# Patient Record
Sex: FEMALE | Race: BLACK OR AFRICAN AMERICAN | NOT HISPANIC OR LATINO | Employment: UNEMPLOYED | URBAN - METROPOLITAN AREA
[De-identification: names, ages, dates, MRNs, and addresses within clinical notes are randomized per-mention and may not be internally consistent; named-entity substitution may affect disease eponyms.]

---

## 2022-05-10 ENCOUNTER — OFFICE VISIT (OUTPATIENT)
Dept: PRIMARY CARE CLINIC | Facility: CLINIC | Age: 82
End: 2022-05-10
Payer: MEDICARE

## 2022-05-10 VITALS
BODY MASS INDEX: 36.33 KG/M2 | OXYGEN SATURATION: 95 % | SYSTOLIC BLOOD PRESSURE: 130 MMHG | WEIGHT: 218.06 LBS | TEMPERATURE: 98 F | HEART RATE: 78 BPM | DIASTOLIC BLOOD PRESSURE: 90 MMHG | HEIGHT: 65 IN

## 2022-05-10 DIAGNOSIS — I10 BENIGN ESSENTIAL HTN: ICD-10-CM

## 2022-05-10 DIAGNOSIS — M1A.9XX0 CHRONIC GOUT INVOLVING TOE WITHOUT TOPHUS, UNSPECIFIED CAUSE, UNSPECIFIED LATERALITY: ICD-10-CM

## 2022-05-10 DIAGNOSIS — Z00.00 ROUTINE GENERAL MEDICAL EXAMINATION AT A HEALTH CARE FACILITY: ICD-10-CM

## 2022-05-10 DIAGNOSIS — N32.89 BLADDER SPASM: ICD-10-CM

## 2022-05-10 DIAGNOSIS — R35.0 FREQUENT URINATION: Primary | ICD-10-CM

## 2022-05-10 DIAGNOSIS — M54.9 BACK PAIN, UNSPECIFIED BACK LOCATION, UNSPECIFIED BACK PAIN LATERALITY, UNSPECIFIED CHRONICITY: ICD-10-CM

## 2022-05-10 LAB
BILIRUB SERPL-MCNC: ABNORMAL MG/DL
BLOOD, POC UA: ABNORMAL
GLUCOSE UR QL STRIP: NORMAL
KETONES UR QL STRIP: ABNORMAL
LEUKOCYTE ESTERASE URINE, POC: ABNORMAL
NITRITE, POC UA: ABNORMAL
PH, POC UA: 5
PROTEIN, POC: 30
SPECIFIC GRAVITY, POC UA: 1.02
UROBILINOGEN, POC UA: 1

## 2022-05-10 PROCEDURE — 81003 POCT URINALYSIS: ICD-10-PCS | Mod: QW,S$GLB,, | Performed by: INTERNAL MEDICINE

## 2022-05-10 PROCEDURE — 99999 PR PBB SHADOW E&M-NEW PATIENT-LVL IV: ICD-10-PCS | Mod: PBBFAC,,, | Performed by: INTERNAL MEDICINE

## 2022-05-10 PROCEDURE — 3075F SYST BP GE 130 - 139MM HG: CPT | Mod: CPTII,S$GLB,, | Performed by: INTERNAL MEDICINE

## 2022-05-10 PROCEDURE — 3075F PR MOST RECENT SYSTOLIC BLOOD PRESS GE 130-139MM HG: ICD-10-PCS | Mod: CPTII,S$GLB,, | Performed by: INTERNAL MEDICINE

## 2022-05-10 PROCEDURE — 3288F PR FALLS RISK ASSESSMENT DOCUMENTED: ICD-10-PCS | Mod: CPTII,S$GLB,, | Performed by: INTERNAL MEDICINE

## 2022-05-10 PROCEDURE — 99999 PR PBB SHADOW E&M-NEW PATIENT-LVL IV: CPT | Mod: PBBFAC,,, | Performed by: INTERNAL MEDICINE

## 2022-05-10 PROCEDURE — 1101F PT FALLS ASSESS-DOCD LE1/YR: CPT | Mod: CPTII,S$GLB,, | Performed by: INTERNAL MEDICINE

## 2022-05-10 PROCEDURE — 1159F PR MEDICATION LIST DOCUMENTED IN MEDICAL RECORD: ICD-10-PCS | Mod: CPTII,S$GLB,, | Performed by: INTERNAL MEDICINE

## 2022-05-10 PROCEDURE — 3288F FALL RISK ASSESSMENT DOCD: CPT | Mod: CPTII,S$GLB,, | Performed by: INTERNAL MEDICINE

## 2022-05-10 PROCEDURE — 99204 PR OFFICE/OUTPT VISIT, NEW, LEVL IV, 45-59 MIN: ICD-10-PCS | Mod: S$GLB,,, | Performed by: INTERNAL MEDICINE

## 2022-05-10 PROCEDURE — 3080F PR MOST RECENT DIASTOLIC BLOOD PRESSURE >= 90 MM HG: ICD-10-PCS | Mod: CPTII,S$GLB,, | Performed by: INTERNAL MEDICINE

## 2022-05-10 PROCEDURE — 1101F PR PT FALLS ASSESS DOC 0-1 FALLS W/OUT INJ PAST YR: ICD-10-PCS | Mod: CPTII,S$GLB,, | Performed by: INTERNAL MEDICINE

## 2022-05-10 PROCEDURE — 1160F PR REVIEW ALL MEDS BY PRESCRIBER/CLIN PHARMACIST DOCUMENTED: ICD-10-PCS | Mod: CPTII,S$GLB,, | Performed by: INTERNAL MEDICINE

## 2022-05-10 PROCEDURE — 3080F DIAST BP >= 90 MM HG: CPT | Mod: CPTII,S$GLB,, | Performed by: INTERNAL MEDICINE

## 2022-05-10 PROCEDURE — 99204 OFFICE O/P NEW MOD 45 MIN: CPT | Mod: S$GLB,,, | Performed by: INTERNAL MEDICINE

## 2022-05-10 PROCEDURE — 1125F AMNT PAIN NOTED PAIN PRSNT: CPT | Mod: CPTII,S$GLB,, | Performed by: INTERNAL MEDICINE

## 2022-05-10 PROCEDURE — 1125F PR PAIN SEVERITY QUANTIFIED, PAIN PRESENT: ICD-10-PCS | Mod: CPTII,S$GLB,, | Performed by: INTERNAL MEDICINE

## 2022-05-10 PROCEDURE — 1159F MED LIST DOCD IN RCRD: CPT | Mod: CPTII,S$GLB,, | Performed by: INTERNAL MEDICINE

## 2022-05-10 PROCEDURE — 81003 URINALYSIS AUTO W/O SCOPE: CPT | Mod: QW,S$GLB,, | Performed by: INTERNAL MEDICINE

## 2022-05-10 PROCEDURE — 1160F RVW MEDS BY RX/DR IN RCRD: CPT | Mod: CPTII,S$GLB,, | Performed by: INTERNAL MEDICINE

## 2022-05-10 RX ORDER — OXYBUTYNIN CHLORIDE 5 MG/1
5 TABLET, EXTENDED RELEASE ORAL DAILY
Qty: 90 TABLET | Refills: 1 | Status: SHIPPED | OUTPATIENT
Start: 2022-05-10 | End: 2022-08-10 | Stop reason: ALTCHOICE

## 2022-05-10 NOTE — PATIENT INSTRUCTIONS
4 months for well visit or sooner if needed.   This is a reminder to be on time for your appointments. When I see patients, I want to be fair and give my time equally to all of my patients. We have 20 minutes together, and 10 minutes of that time is usually spent on rooming. Even small delays can make a huge difference in your visit so I advise you to check-in 10 minutes before your appointment time. This ensures that I can spend as much time with you as possible instead of needing to cut your visit short. Thank you for your consideration.

## 2022-05-10 NOTE — PROGRESS NOTES
Subjective:      Patient ID: Brittnee Gupta is a 81 y.o. female.    Chief Complaint: Back Pain (Lower back with discolored urine)    Here today for general exam.     Frequent urination: She is here to discuss her frequent urination. This is a chronic issue that has been persisting for at least 3 months. She reports difficulty holding onto her urine. Denies malodorous urine. POCT UA negative.     HTN: She continues on antihypertensives.  She did not bring in her medications at this time, she reports that she takes 2 medications for HTN. Obtain records. Office /90. No CP/SOB/lightheadedness/dizziness/palpitations.     Chronic gout: She has hx of chronic gout, normally in her big toes with flare up. She is taking allopurinol and colchicine.      Denies any chest pain, shortness of breath, nausea vomiting constipation diarrhea, blood in stool, heartburn    Review of Systems   Constitutional: Negative for chills, fever and weight loss.   HENT: Negative for congestion, ear pain and sore throat.    Eyes: Negative for double vision.   Respiratory: Negative for cough and shortness of breath.    Cardiovascular: Negative for chest pain, palpitations and leg swelling.   Gastrointestinal: Negative for abdominal pain, heartburn, nausea and vomiting.   Genitourinary: Positive for frequency and urgency. Negative for dysuria.   Skin: Negative for rash.   Neurological: Negative for dizziness, tingling and headaches.   Psychiatric/Behavioral: Negative for depression.         Current Outpatient Medications:     oxybutynin (DITROPAN-XL) 5 MG TR24, Take 1 tablet (5 mg total) by mouth once daily., Disp: 90 tablet, Rfl: 1    No results found for: HGBA1C  No results found for: MICALBCREAT  No results found for: LDLCALC, CHOL, HDL, TRIG    No results found for: NA, K, CL, CO2, GLU, BUN, CREATININE, CALCIUM, PROT, ALBUMIN, BILITOT, ALKPHOS, AST, ALT, ANIONGAP, ESTGFRAFRICA, EGFRNONAA, WBC, HGB, HCT, MCV, PLT, TSH, PSA, PSADIAG,  "HEPCAB    No results found for: LH, FSH, TOTALTESTOST, PROGESTERONE, ESTRADIOL, [id number], QMNYYNNJ75, FERRITIN, IRON, TRANSFERRIN, TIBC, FESATURATED, ZINC      History reviewed. No pertinent past medical history.  Past Surgical History:   Procedure Laterality Date    HYSTERECTOMY      MYOMECTOMY       Social History     Social History Narrative    Not on file     History reviewed. No pertinent family history.  Vitals:    05/10/22 1126   BP: (!) 130/90   Pulse: 78   Temp: 98.1 °F (36.7 °C)   SpO2: 95%   Weight: 98.9 kg (218 lb 0.6 oz)   Height: 5' 5" (1.651 m)   PainSc:   5     Objective:   Physical Exam  Vitals reviewed.   Constitutional:       Appearance: Normal appearance.   HENT:      Head: Normocephalic.      Right Ear: Tympanic membrane, ear canal and external ear normal.      Left Ear: Tympanic membrane, ear canal and external ear normal.      Nose: Nose normal.      Mouth/Throat:      Mouth: Mucous membranes are moist.      Pharynx: Oropharynx is clear.   Eyes:      Conjunctiva/sclera: Conjunctivae normal.      Pupils: Pupils are equal, round, and reactive to light.   Cardiovascular:      Rate and Rhythm: Normal rate and regular rhythm.      Pulses: Normal pulses.   Pulmonary:      Effort: Pulmonary effort is normal.      Breath sounds: Normal breath sounds.   Abdominal:      General: Abdomen is flat. Bowel sounds are normal.      Palpations: Abdomen is soft.   Musculoskeletal:      Cervical back: Neck supple.   Skin:     General: Skin is warm.   Neurological:      General: No focal deficit present.      Mental Status: She is alert.   Psychiatric:         Mood and Affect: Mood normal.       Assessment:     1. Frequent urination    2. Benign essential HTN    3. Back pain, unspecified back location, unspecified back pain laterality, unspecified chronicity    4. Chronic gout involving toe without tophus, unspecified cause, unspecified laterality    5. Routine general medical examination at a [facility]" facility    6. Bladder spasm      Plan:     Orders Placed This Encounter    Ambulatory referral/consult to Physical/Occupational Therapy    Ambulatory referral/consult to Podiatry    Ambulatory referral/consult to Urogynecology    POCT URINALYSIS    oxybutynin (DITROPAN-XL) 5 MG TR24       Patient Instructions   1. 4 months for well visit or sooner if needed.   2. This is a reminder to be on time for your appointments. When I see patients, I want to be fair and give my time equally to all of my patients. We have 20 minutes together, and 10 minutes of that time is usually spent on rooming. Even small delays can make a huge difference in your visit so I advise you to check-in 10 minutes before your appointment time. This ensures that I can spend as much time with you as possible instead of needing to cut your visit short. Thank you for your consideration.

## 2022-06-01 ENCOUNTER — OFFICE VISIT (OUTPATIENT)
Dept: UROGYNECOLOGY | Facility: CLINIC | Age: 82
End: 2022-06-01
Payer: MEDICARE

## 2022-06-01 VITALS
HEIGHT: 65 IN | WEIGHT: 216.06 LBS | DIASTOLIC BLOOD PRESSURE: 72 MMHG | BODY MASS INDEX: 36 KG/M2 | SYSTOLIC BLOOD PRESSURE: 124 MMHG

## 2022-06-01 DIAGNOSIS — R35.0 FREQUENT URINATION: ICD-10-CM

## 2022-06-01 DIAGNOSIS — N39.41 URINARY INCONTINENCE, URGE: ICD-10-CM

## 2022-06-01 DIAGNOSIS — R35.1 NOCTURIA: Primary | ICD-10-CM

## 2022-06-01 DIAGNOSIS — N32.89 BLADDER SPASM: ICD-10-CM

## 2022-06-01 DIAGNOSIS — K59.09 CHRONIC CONSTIPATION: ICD-10-CM

## 2022-06-01 DIAGNOSIS — N39.46 MIXED STRESS AND URGE URINARY INCONTINENCE: ICD-10-CM

## 2022-06-01 PROCEDURE — 1159F MED LIST DOCD IN RCRD: CPT | Mod: CPTII,S$GLB,, | Performed by: OBSTETRICS & GYNECOLOGY

## 2022-06-01 PROCEDURE — 3074F SYST BP LT 130 MM HG: CPT | Mod: CPTII,S$GLB,, | Performed by: OBSTETRICS & GYNECOLOGY

## 2022-06-01 PROCEDURE — 1159F PR MEDICATION LIST DOCUMENTED IN MEDICAL RECORD: ICD-10-PCS | Mod: CPTII,S$GLB,, | Performed by: OBSTETRICS & GYNECOLOGY

## 2022-06-01 PROCEDURE — 3288F FALL RISK ASSESSMENT DOCD: CPT | Mod: CPTII,S$GLB,, | Performed by: OBSTETRICS & GYNECOLOGY

## 2022-06-01 PROCEDURE — 51701 INSERT BLADDER CATHETER: CPT | Mod: S$GLB,,, | Performed by: OBSTETRICS & GYNECOLOGY

## 2022-06-01 PROCEDURE — 1160F PR REVIEW ALL MEDS BY PRESCRIBER/CLIN PHARMACIST DOCUMENTED: ICD-10-PCS | Mod: CPTII,S$GLB,, | Performed by: OBSTETRICS & GYNECOLOGY

## 2022-06-01 PROCEDURE — 99999 PR PBB SHADOW E&M-EST. PATIENT-LVL IV: CPT | Mod: PBBFAC,,, | Performed by: OBSTETRICS & GYNECOLOGY

## 2022-06-01 PROCEDURE — 99204 PR OFFICE/OUTPT VISIT, NEW, LEVL IV, 45-59 MIN: ICD-10-PCS | Mod: 25,S$GLB,, | Performed by: OBSTETRICS & GYNECOLOGY

## 2022-06-01 PROCEDURE — 3074F PR MOST RECENT SYSTOLIC BLOOD PRESSURE < 130 MM HG: ICD-10-PCS | Mod: CPTII,S$GLB,, | Performed by: OBSTETRICS & GYNECOLOGY

## 2022-06-01 PROCEDURE — 3078F PR MOST RECENT DIASTOLIC BLOOD PRESSURE < 80 MM HG: ICD-10-PCS | Mod: CPTII,S$GLB,, | Performed by: OBSTETRICS & GYNECOLOGY

## 2022-06-01 PROCEDURE — 3288F PR FALLS RISK ASSESSMENT DOCUMENTED: ICD-10-PCS | Mod: CPTII,S$GLB,, | Performed by: OBSTETRICS & GYNECOLOGY

## 2022-06-01 PROCEDURE — 51701 PR INSERTION OF NON-INDWELLING BLADDER CATHETERIZATION FOR RESIDUAL UR: ICD-10-PCS | Mod: S$GLB,,, | Performed by: OBSTETRICS & GYNECOLOGY

## 2022-06-01 PROCEDURE — 3078F DIAST BP <80 MM HG: CPT | Mod: CPTII,S$GLB,, | Performed by: OBSTETRICS & GYNECOLOGY

## 2022-06-01 PROCEDURE — 1101F PT FALLS ASSESS-DOCD LE1/YR: CPT | Mod: CPTII,S$GLB,, | Performed by: OBSTETRICS & GYNECOLOGY

## 2022-06-01 PROCEDURE — 1126F AMNT PAIN NOTED NONE PRSNT: CPT | Mod: CPTII,S$GLB,, | Performed by: OBSTETRICS & GYNECOLOGY

## 2022-06-01 PROCEDURE — 99204 OFFICE O/P NEW MOD 45 MIN: CPT | Mod: 25,S$GLB,, | Performed by: OBSTETRICS & GYNECOLOGY

## 2022-06-01 PROCEDURE — 87086 URINE CULTURE/COLONY COUNT: CPT | Performed by: OBSTETRICS & GYNECOLOGY

## 2022-06-01 PROCEDURE — 1101F PR PT FALLS ASSESS DOC 0-1 FALLS W/OUT INJ PAST YR: ICD-10-PCS | Mod: CPTII,S$GLB,, | Performed by: OBSTETRICS & GYNECOLOGY

## 2022-06-01 PROCEDURE — 1160F RVW MEDS BY RX/DR IN RCRD: CPT | Mod: CPTII,S$GLB,, | Performed by: OBSTETRICS & GYNECOLOGY

## 2022-06-01 PROCEDURE — 99999 PR PBB SHADOW E&M-EST. PATIENT-LVL IV: ICD-10-PCS | Mod: PBBFAC,,, | Performed by: OBSTETRICS & GYNECOLOGY

## 2022-06-01 PROCEDURE — 1126F PR PAIN SEVERITY QUANTIFIED, NO PAIN PRESENT: ICD-10-PCS | Mod: CPTII,S$GLB,, | Performed by: OBSTETRICS & GYNECOLOGY

## 2022-06-01 RX ORDER — METOPROLOL SUCCINATE 25 MG/1
25 TABLET, EXTENDED RELEASE ORAL DAILY
COMMUNITY
End: 2023-03-10 | Stop reason: SDUPTHER

## 2022-06-01 RX ORDER — CALCIUM CARBONATE 500(1250)
1 TABLET ORAL DAILY
COMMUNITY

## 2022-06-01 RX ORDER — LOSARTAN POTASSIUM 100 MG/1
100 TABLET ORAL DAILY
COMMUNITY
End: 2023-03-09 | Stop reason: SDUPTHER

## 2022-06-01 RX ORDER — ALBUTEROL SULFATE 90 UG/1
2 AEROSOL, METERED RESPIRATORY (INHALATION) EVERY 6 HOURS PRN
COMMUNITY
End: 2023-03-14 | Stop reason: SDUPTHER

## 2022-06-01 RX ORDER — TRAMADOL HYDROCHLORIDE 50 MG/1
50 TABLET ORAL EVERY 6 HOURS PRN
COMMUNITY
End: 2023-06-08

## 2022-06-01 RX ORDER — LORATADINE 10 MG/1
10 TABLET ORAL DAILY
COMMUNITY

## 2022-06-01 RX ORDER — AMLODIPINE BESYLATE 10 MG/1
10 TABLET ORAL DAILY
COMMUNITY
End: 2023-03-09 | Stop reason: SDUPTHER

## 2022-06-01 RX ORDER — ATORVASTATIN CALCIUM 80 MG/1
80 TABLET, FILM COATED ORAL DAILY
COMMUNITY
End: 2023-03-14 | Stop reason: SDUPTHER

## 2022-06-01 RX ORDER — MONTELUKAST SODIUM 10 MG/1
10 TABLET ORAL NIGHTLY
COMMUNITY
End: 2023-03-14 | Stop reason: SDUPTHER

## 2022-06-01 RX ORDER — FLUTICASONE PROPIONATE 50 UG/1
POWDER, METERED RESPIRATORY (INHALATION)
COMMUNITY
End: 2022-12-08 | Stop reason: ALTCHOICE

## 2022-06-01 RX ORDER — ALLOPURINOL 300 MG/1
TABLET ORAL DAILY
COMMUNITY
End: 2023-02-22 | Stop reason: SDUPTHER

## 2022-06-01 RX ORDER — ASPIRIN 81 MG/1
TABLET ORAL DAILY
COMMUNITY

## 2022-06-01 RX ORDER — DOCUSATE SODIUM 100 MG/1
100 CAPSULE, LIQUID FILLED ORAL 2 TIMES DAILY
COMMUNITY

## 2022-06-01 RX ORDER — DIPHENHYDRAMINE HCL 25 MG
25 CAPSULE ORAL EVERY 6 HOURS PRN
COMMUNITY

## 2022-06-01 RX ORDER — OMEPRAZOLE 20 MG/1
20 CAPSULE, DELAYED RELEASE ORAL DAILY
COMMUNITY
End: 2023-02-14 | Stop reason: SDUPTHER

## 2022-06-01 RX ORDER — AZELASTINE 1 MG/ML
1 SPRAY, METERED NASAL 2 TIMES DAILY
COMMUNITY

## 2022-06-01 NOTE — PROGRESS NOTES
[facility] - UROGYNECOLOGY  [address] [id number]    Brittnee Gupta  [id number]  1940  2022    Consulting Physician: Diane Trevino MD   GYN: none  Primary M.D.: Diane Trevino MD    Chief Complaint   Patient presents with    Urinary Frequency    BLADDER SPASM     HPI:     Patient left hearing aids at home.     1)  UI:  (+) JAX < (+) UUI.  No pad use during day--stays dry.  Daytime frequency: Q 3-4 hours.  Nocturia: Yes: Q2h. Goes through 2-3 PPN, +UUI. Has toilet nearby. Does have LE swelling. No water by bed or by bed. Was Rx'd oxybuytnin 5 xl--made dizzy so stopped before could tell if helping.   (--) dysuria,  (--) hematuria,  (--) frequent UTIs.  (+) complete bladder emptying.     2)  POP:  Absent. (--) vaginal bleeding. (--) vaginal discharge. (--) sexually active.  (--) dyspareunia.  (--)  Vaginal dryness.  (--) vaginal estrogen use.     3)  BM:  (+) constipation/straining. Takes stool softener PRN.  (--) chronic diarrhea. (--) hematochezia.  (--) fecal incontinence.  (--) fecal smearing/urgency.  (--) complete evacuation.     Past Medical History  Past Medical History:   Diagnosis Date    Allergy     Asthma     GERD (gastroesophageal reflux disease)     Hypertension    Gout  GERD  Allergies/?asthma  Hypercholesterolemia    Past Surgical History  Past Surgical History:   Procedure Laterality Date    HYSTERECTOMY      MYOMECTOMY     LSC myomectomy     Hysterectomy: Yes   Date: unknown.  Indication: menorrhagia/fibroids.    Type: xlap/Pfannenstiel  Cervix present: No  Ovaries present: unknown  Other procedures at time of hysterectomy:  none    Past Ob History     x 5.  C/s x 0.    Largest infant weight: 8.5#  unknown FAVD. unknown episiotomy.      Gynecologic History  LMP: No LMP recorded. Patient is postmenopausal.  Age of menarche: 15 yo  Age of menopause: with CAROLYN  Menstrual history: h/o menorrhagia  Pap test: post hyst.  History of abnormal paps: No.   History of STIs:  No  [place] [place]: [place]  Colonoscopy: Date of last: about 4 years ago.  Result:  Normal per report.  Repeat due:  Per PCP.    [place]:  [place] [place]  Result:  Osteopenia, FRAX not sig elevated.  Repeat due:  Per PCP.     Family History  History reviewed. No pertinent family history.   Colon CA: No  Breast CA: No  GYN CA: No   CA: No    Social History  Social History     Tobacco Use   Smoking Status Former Smoker    Quit date: 5/10/1992    Years since quittin.0   Smokeless Tobacco Never Used     Social History     Substance and Sexual Activity   Alcohol Use Not Currently   .    Social History     Substance and Sexual Activity   Drug Use Never     The patient is .  Resides in [place] [address] alone.   Employment status: retired childcare, domestic.     Allergies  Review of patient's allergies indicates:  No Known Allergies    Medications  Current Outpatient Medications on File Prior to Visit   Medication Sig Dispense Refill    albuterol (VENTOLIN HFA) 90 mcg/actuation inhaler Inhale 2 puffs into the lungs every 6 (six) hours as needed for Wheezing. Rescue      allopurinoL (ZYLOPRIM) 300 MG tablet Take by mouth once daily.      amLODIPine (NORVASC) 10 MG tablet Take 10 mg by mouth once daily.      aspirin (ECOTRIN) 81 MG EC tablet Take by mouth once daily.      atorvastatin (LIPITOR) 80 MG tablet Take 80 mg by mouth once daily.      azelastine (ASTELIN) 137 mcg (0.1 %) nasal spray 1 spray by Nasal route 2 (two) times daily.      calcium carbonate (CALCIUM 500) 500 mg calcium (1,250 mg) tablet Take 1 tablet by mouth once daily.      diphenhydrAMINE (BENADRYL) 25 mg capsule Take 25 mg by mouth every 6 (six) hours as needed for Itching.      docusate sodium (COLACE) 100 MG capsule Take 100 mg by mouth 2 (two) times daily.      fluticasone propionate (FLOVENT DISKUS) 50 mcg/actuation DsDv Inhale into the lungs. Controller    "   loratadine (CLARITIN) 10 mg tablet Take 10 mg by mouth once daily.      losartan (COZAAR) 100 MG tablet Take 100 mg by mouth once daily.      metoprolol succinate (TOPROL-XL) 25 MG 24 hr tablet Take 25 mg by mouth once daily.      montelukast (SINGULAIR) 10 mg tablet Take 10 mg by mouth every evening.      omeprazole (PRILOSEC) 20 MG capsule Take 20 mg by mouth once daily.      oxybutynin (DITROPAN-XL) 5 MG TR24 Take 1 tablet (5 mg total) by mouth once daily. 90 tablet 1    traMADoL (ULTRAM) 50 mg tablet Take 50 mg by mouth every 6 (six) hours as needed for Pain.       No current facility-administered medications on file prior to visit.       Review of Systems A 14 point ROS was reviewed with pertinent positives as noted above in the history of present illness.      Constitutional: negative  Eyes: negative  Endocrine: negative  Gastrointestinal: negative  Cardiovascular: negative  Respiratory: negative  Allergic/Immunologic: negative  Integumentary: negative  Psychiatric: negative  Musculoskeletal: negative   Ear/Nose/Throat: negative  Neurologic: negative  Genitourinary: SEE HPI  Hematologic/Lymphatic: negative   Breast: negative    Urogynecologic Exam  /72   Ht 5' 5" (1.651 m)   Wt 98 kg (216 lb 0.8 oz)   BMI 35.95 kg/m²     GENERAL APPEARANCE:  The patient is well-developed, well-nourished.   Neck:  Supple with no thyromegaly, no carotid bruits.  Heart:  Regular rate and rhythm, no murmurs, rubs or gallops.  Lungs:  Clear.  No CVA tenderness.  Abdomen:  Soft, nontender, nondistended, no hepatosplenomegaly.  Incisions:  Pfannenstiel well-healed    PELVIC:    External genitalia:  Normal Bartholins, Skenes and labia bilaterally.    Urethra:  No caruncle, diverticulum or masses.  (+) hypermobility.    Vagina:  Atrophy (--) , no bladder masses or tender, no discharge.    Cervix:  absent  Uterus: uterus absent  Adnexa: Not palpable.    POP-Q:   Deferred.  No obvious POP present with valsalva. "     NEUROLOGIC:  Cranial nerves 2 through 12 intact.  Strength 5/5.  DTRs 2+ lower extremities.  S2 through 4 normal.  Sacral reflexes intact.    EXT: CHO, 2+ pulses bilaterally, no C/C; 1+ edema LE B    COUGH STRESS TEST:  negative  KEGEL: 1 /5    RECTAL:    External:  Normal, (--) hemorrhoids, (--) dovetailing.   Internal:   (--) tenderness, (--) masses, Normal resting tone, Normal active tone.     PVR: 10 mL    Impression    1. Nocturia    2. Frequent urination    3. Bladder spasm    4. Urinary incontinence, urge    5. Mixed stress and urge urinary incontinence    6. Chronic constipation        Initial Plan  The patient was counseled regarding these issues. The patient was given a summary sheet containing each of these issues with possible options for evaluation and management. When appropriate, we also reviewed computer-generated diagrams specific to their diagnoses..  All questions were addressed to the patient's satisfaction.    1)  Mixed urinary incontinence, urge > stress:    --urine C&S  --Empty bladder every 3 hours.  Empty well: wait a minute, lean forward on toilet.    --watch caffeine intake, especially after 4 PM  --start pelvic floor PT. Call to make appt:  [facility] (all take Medicaid):  YOSI Sierratist: (p) [phone].  Or [phone].   --URGE: wait on medication--do not take oxybutynin (makes dizzy); can consider mirabegron in future if needed  --STRESS:  Pessary vs. Sling.     2)  Nocturia (nighttime urination):   --stop fluids 2 hours before bed.    --no water by bed  --If have leg swelling:  Elevate feet above chest x 1 hour before bed to get excess fluid off.  Can also use support hose (knee highs).     --need to see PCP to discuss LE swelling in setting of HTN--messaged Dr. Trevino so can help address    3)  Constipation:  --hydrate well  Controlling constipation may help bladder urgency/leakage and fiber may better control cholesterol and blood glucose.  Start daily fiber.  Take 1 tsp of fiber  powder (psyllium or other sugar-free powder).  Mix in 8 oz of water.  Take x 3-5 days.  Then, increase fiber by 1 tsp every 3-5 days until stool is easy to pass.  Stop and continue at that dose.   Do not exceed 6 tsps/day.  May also use over the counter stool softener 1-2 x/day.  AVOID laxatives.    4)  RTC 3-4 months.     Approximately 45 min were spent in consult, 90 % in discussion.     Thank you for requesting consultation of your patient.  I look forward to participating in their care.    Nolvia Nicholson  Female Pelvic Medicine and Reconstructive Surgery  [facility] [place], [place]

## 2022-06-01 NOTE — PATIENT INSTRUCTIONS
1)  Mixed urinary incontinence, urge > stress:    --urine C&S  --Empty bladder every 3 hours.  Empty well: wait a minute, lean forward on toilet.    --watch caffeine intake, especially after 4 PM  --start pelvic floor PT. Call to make appt:  [facility] (all take Medicaid):  YOSI Rastafarian: (p) [phone].  Or [phone].   --URGE: wait on medication--do not take oxybutynin (makes dizzy); can consider mirabegron in future if needed  --STRESS:  Pessary vs. Sling.     2)  Nocturia (nighttime urination):   --stop fluids 2 hours before bed.    --no water by bed  --If have leg swelling:  Elevate feet above chest x 1 hour before bed to get excess fluid off.  Can also use support hose (knee highs).     --need to see PCP to discuss LE swelling--will message Dr. Trevino so can address    3)  Constipation:  --hydrate well  Controlling constipation may help bladder urgency/leakage and fiber may better control cholesterol and blood glucose.  Start daily fiber.  Take 1 tsp of fiber powder (psyllium or other sugar-free powder).  Mix in 8 oz of water.  Take x 3-5 days.  Then, increase fiber by 1 tsp every 3-5 days until stool is easy to pass.  Stop and continue at that dose.   Do not exceed 6 tsps/day.  May also use over the counter stool softener 1-2 x/day.  AVOID laxatives.    4)  RTC 3-4 months.

## 2022-06-02 LAB — BACTERIA UR CULT: NO GROWTH

## 2022-06-03 ENCOUNTER — TELEPHONE (OUTPATIENT)
Dept: UROGYNECOLOGY | Facility: CLINIC | Age: 82
End: 2022-06-03
Payer: MEDICARE

## 2022-06-03 NOTE — TELEPHONE ENCOUNTER
Reached out to patient per MD [address] Staff  Cc: Justin Haney MA  Please let the patient know urine C&S was negative for infection.  Thanks!   Patient phone line was unavailable;  could not be left.  I called patients secondary number; lvm instructing a call back from the patient.    Justin BOWERS MA

## 2022-06-03 NOTE — TELEPHONE ENCOUNTER
----- Message from Nolvia Nicholson MD sent at 6/2/2022  8:50 PM CDT -----  Please let the patient know urine C&S was negative for infection.  Thanks!

## 2022-06-15 PROBLEM — R27.9 LACK OF COORDINATION: Status: ACTIVE | Noted: 2022-06-15

## 2022-06-22 ENCOUNTER — CLINICAL SUPPORT (OUTPATIENT)
Dept: REHABILITATION | Facility: OTHER | Age: 82
End: 2022-06-22
Attending: OBSTETRICS & GYNECOLOGY
Payer: MEDICARE

## 2022-06-22 ENCOUNTER — TELEPHONE (OUTPATIENT)
Dept: UROGYNECOLOGY | Facility: CLINIC | Age: 82
End: 2022-06-22
Payer: MEDICARE

## 2022-06-22 DIAGNOSIS — Z99.89 USES WALKER: ICD-10-CM

## 2022-06-22 DIAGNOSIS — R54 AGE-RELATED PHYSICAL DEBILITY: ICD-10-CM

## 2022-06-22 DIAGNOSIS — R26.2 IMPAIRED AMBULATION: ICD-10-CM

## 2022-06-22 DIAGNOSIS — R35.1 NOCTURIA: ICD-10-CM

## 2022-06-22 DIAGNOSIS — M62.81 MUSCLE WEAKNESS (GENERALIZED): ICD-10-CM

## 2022-06-22 DIAGNOSIS — N39.41 URINARY INCONTINENCE, URGE: ICD-10-CM

## 2022-06-22 DIAGNOSIS — R35.0 FREQUENT URINATION: ICD-10-CM

## 2022-06-22 DIAGNOSIS — R27.9 LACK OF COORDINATION: ICD-10-CM

## 2022-06-22 DIAGNOSIS — R39.81 FUNCTIONAL URINARY INCONTINENCE: Primary | ICD-10-CM

## 2022-06-22 PROCEDURE — 97530 THERAPEUTIC ACTIVITIES: CPT

## 2022-06-22 PROCEDURE — 97161 PT EVAL LOW COMPLEX 20 MIN: CPT

## 2022-06-22 NOTE — PATIENT INSTRUCTIONS
Home Exercise Program: 06/22/2022      BLADDER HEALTH      WHAT IS CONSIDERED NORMAL?  The average bladder can hold about 2 cups of urine before it needs to be emptied.  The normal range of voiding urine is 6 to 8 times during a 24 hour period, or every 3-4 hours. As we get older, our bladder capacity can get smaller and we may need to pass urine more frequently but usually not more than every 2 hours.  Urine should flow easily without discomfort in a good, steady stream until the bladder is empty. No pushing or straining is necessary to empty the bladder.  An urge is a normal signal that you feel as the bladder stretches to fill with urine. Urges can be felt even if the bladder is not full. Urges are not commands to go to the toilet, merely a signal and can be controlled.    WHAT ARE GOOD BLADDER HABITS?  Take your time when emptying your bladder. Dont strain or push to empty your bladder. Make sure you empty your bladder completely each time you pass urine.  Dont wait too long - consistently ignoring the urge to go (waiting more than 4 hours between toileting) or urinating too infrequently may be convenient but not healthy for your bladder. You can actually overstretch your bladder beyond its normal size.   Dont go too often - avoid going to the toilet just in case (more often than every 2 hours). It is usually not necessary to go when you feel the first urge. Try to go only when your bladder is full. Dont let your bladder control your life.    DIET CAN AFFECT THE BLADDER  Maintain a healthy fluid intake. Many people decrease their intake of liquids in hopes that they will need to urinate less frequently or have less urinary leakage. While a decrease in liquid intake does result in a decrease in the volume of urine, the smaller amount of urine may be more highly concentrated. Highly concentrated, dark yellow urine is irritating to the bladder surface and may actually cause you to go to the bathroom more  frequently. It also encourages the growth of bacteria, which may lead to infections resulting in incontinence.  Depending on your body size and environment, drink 4-8 cups (8 oz each) of fluid per day, spread throughout the entire day, unless otherwise advised by your doctor.    Avoid of use the following acidic food/beverages in moderation:  Alcoholic beverages    Tomato-based products  Vinegar  Coffee (regular and decaf)  Tea (regular and decaf)  Citrus fruits and juices  Spicy foods, alfaro  Caffeinated beverages & soda   Milk  Food colorings and flavorings  Artificial sweeteners  Chocolate    Try using these dietary substitutions:  Water: grape juice, apple juice  Fruit: pears, apricots, papaya, watermelon  Coffee: KAVA®, Postum®, Bear®, Kaffree [place]®  Tea: Non-citrus herbal, sun-brewed tea  Vitamin C: Calcium carbonate co-buffered with calcium ascorbate    Avoid constipation by maintaining a balanced diet of dietary fiber. Typical dietary recommendations for fiber are between 25-35 grams per day. You should discuss your fiber needs with your physician, pharmacist or nutritionist.    Limit fluids 2 hours before bedtime to decrease nighttime urination.       Urge Suppression Techniques:    Be still - stop what you are doing. If seated, stay seated. If standing, stand still or sit down if you can.    Belly breathing - Do 5-10 slow belly breath    Kegels/Quick Flicks  - Do 5-10 contractions/relaxations of the pelvic floor muscles    Go with control, walk calmly to the toilet.  - When the urge has subsided, walk calmly to the bathroom. If needed, stop and repeat the above steps.      Use the above techniques to see if you can reduce or calm the feeling of urgency. These may help to make you more comfortable as well as increase the time between trips to the bathroom.

## 2022-06-24 PROCEDURE — G0180 PR HOME HEALTH MD CERTIFICATION: ICD-10-PCS | Mod: ,,, | Performed by: OBSTETRICS & GYNECOLOGY

## 2022-06-24 PROCEDURE — G0180 MD CERTIFICATION HHA PATIENT: HCPCS | Mod: ,,, | Performed by: OBSTETRICS & GYNECOLOGY

## 2022-07-18 ENCOUNTER — EXTERNAL HOME HEALTH (OUTPATIENT)
Dept: HOME HEALTH SERVICES | Facility: HOSPITAL | Age: 82
End: 2022-07-18
Payer: MEDICARE

## 2022-08-10 ENCOUNTER — OFFICE VISIT (OUTPATIENT)
Dept: PRIMARY CARE CLINIC | Facility: CLINIC | Age: 82
End: 2022-08-10
Payer: MEDICARE

## 2022-08-10 ENCOUNTER — LAB VISIT (OUTPATIENT)
Dept: LAB | Facility: HOSPITAL | Age: 82
End: 2022-08-10
Attending: INTERNAL MEDICINE
Payer: MEDICARE

## 2022-08-10 VITALS
HEIGHT: 65 IN | HEART RATE: 60 BPM | OXYGEN SATURATION: 97 % | SYSTOLIC BLOOD PRESSURE: 122 MMHG | BODY MASS INDEX: 36.01 KG/M2 | WEIGHT: 216.13 LBS | DIASTOLIC BLOOD PRESSURE: 70 MMHG

## 2022-08-10 DIAGNOSIS — I50.32 CHRONIC DIASTOLIC CONGESTIVE HEART FAILURE, NYHA CLASS 1: ICD-10-CM

## 2022-08-10 DIAGNOSIS — M1A.9XX0 CHRONIC GOUT INVOLVING TOE WITHOUT TOPHUS, UNSPECIFIED CAUSE, UNSPECIFIED LATERALITY: ICD-10-CM

## 2022-08-10 DIAGNOSIS — N39.46 MIXED STRESS AND URGE URINARY INCONTINENCE: ICD-10-CM

## 2022-08-10 DIAGNOSIS — I10 BENIGN ESSENTIAL HTN: ICD-10-CM

## 2022-08-10 DIAGNOSIS — Z00.00 ROUTINE GENERAL MEDICAL EXAMINATION AT A HEALTH CARE FACILITY: Primary | ICD-10-CM

## 2022-08-10 DIAGNOSIS — E78.5 HYPERLIPIDEMIA, UNSPECIFIED HYPERLIPIDEMIA TYPE: ICD-10-CM

## 2022-08-10 DIAGNOSIS — Z00.00 ROUTINE GENERAL MEDICAL EXAMINATION AT A HEALTH CARE FACILITY: ICD-10-CM

## 2022-08-10 LAB
ALBUMIN SERPL BCP-MCNC: 4 G/DL (ref 3.5–5.2)
ALP SERPL-CCNC: 127 U/L (ref 55–135)
ALT SERPL W/O P-5'-P-CCNC: 16 U/L (ref 10–44)
ANION GAP SERPL CALC-SCNC: 12 MMOL/L (ref 8–16)
AST SERPL-CCNC: 20 U/L (ref 10–40)
BILIRUB SERPL-MCNC: 1.3 MG/DL (ref 0.1–1)
BUN SERPL-MCNC: 13 MG/DL (ref 8–23)
CALCIUM SERPL-MCNC: 9.9 MG/DL (ref 8.7–10.5)
CHLORIDE SERPL-SCNC: 102 MMOL/L (ref 95–110)
CHOLEST SERPL-MCNC: 118 MG/DL (ref 120–199)
CHOLEST/HDLC SERPL: 2.2 {RATIO} (ref 2–5)
CO2 SERPL-SCNC: 26 MMOL/L (ref 23–29)
CREAT SERPL-MCNC: 0.8 MG/DL (ref 0.5–1.4)
EST. GFR  (NO RACE VARIABLE): >60 ML/MIN/1.73 M^2
ESTIMATED AVG GLUCOSE: 117 MG/DL (ref 68–131)
GLUCOSE SERPL-MCNC: 108 MG/DL (ref 70–110)
HBA1C MFR BLD: 5.7 % (ref 4–5.6)
HDLC SERPL-MCNC: 54 MG/DL (ref 40–75)
HDLC SERPL: 45.8 % (ref 20–50)
LDLC SERPL CALC-MCNC: 50.8 MG/DL (ref 63–159)
NONHDLC SERPL-MCNC: 64 MG/DL
POTASSIUM SERPL-SCNC: 3.8 MMOL/L (ref 3.5–5.1)
PROT SERPL-MCNC: 7.9 G/DL (ref 6–8.4)
SODIUM SERPL-SCNC: 140 MMOL/L (ref 136–145)
TRIGL SERPL-MCNC: 66 MG/DL (ref 30–150)
TSH SERPL DL<=0.005 MIU/L-ACNC: 1.07 UIU/ML (ref 0.4–4)

## 2022-08-10 PROCEDURE — 1159F MED LIST DOCD IN RCRD: CPT | Mod: CPTII,S$GLB,, | Performed by: INTERNAL MEDICINE

## 2022-08-10 PROCEDURE — 99999 PR PBB SHADOW E&M-EST. PATIENT-LVL V: CPT | Mod: PBBFAC,,, | Performed by: INTERNAL MEDICINE

## 2022-08-10 PROCEDURE — 83036 HEMOGLOBIN GLYCOSYLATED A1C: CPT | Performed by: INTERNAL MEDICINE

## 2022-08-10 PROCEDURE — 99999 PR PBB SHADOW E&M-EST. PATIENT-LVL V: ICD-10-PCS | Mod: PBBFAC,,, | Performed by: INTERNAL MEDICINE

## 2022-08-10 PROCEDURE — 1160F RVW MEDS BY RX/DR IN RCRD: CPT | Mod: CPTII,S$GLB,, | Performed by: INTERNAL MEDICINE

## 2022-08-10 PROCEDURE — 99397 PER PM REEVAL EST PAT 65+ YR: CPT | Mod: S$GLB,,, | Performed by: INTERNAL MEDICINE

## 2022-08-10 PROCEDURE — 1101F PR PT FALLS ASSESS DOC 0-1 FALLS W/OUT INJ PAST YR: ICD-10-PCS | Mod: CPTII,S$GLB,, | Performed by: INTERNAL MEDICINE

## 2022-08-10 PROCEDURE — 3078F DIAST BP <80 MM HG: CPT | Mod: CPTII,S$GLB,, | Performed by: INTERNAL MEDICINE

## 2022-08-10 PROCEDURE — 3074F PR MOST RECENT SYSTOLIC BLOOD PRESSURE < 130 MM HG: ICD-10-PCS | Mod: CPTII,S$GLB,, | Performed by: INTERNAL MEDICINE

## 2022-08-10 PROCEDURE — 1159F PR MEDICATION LIST DOCUMENTED IN MEDICAL RECORD: ICD-10-PCS | Mod: CPTII,S$GLB,, | Performed by: INTERNAL MEDICINE

## 2022-08-10 PROCEDURE — 36415 COLL VENOUS BLD VENIPUNCTURE: CPT | Mod: PN | Performed by: INTERNAL MEDICINE

## 2022-08-10 PROCEDURE — 3288F PR FALLS RISK ASSESSMENT DOCUMENTED: ICD-10-PCS | Mod: CPTII,S$GLB,, | Performed by: INTERNAL MEDICINE

## 2022-08-10 PROCEDURE — 80053 COMPREHEN METABOLIC PANEL: CPT | Performed by: INTERNAL MEDICINE

## 2022-08-10 PROCEDURE — 1126F PR PAIN SEVERITY QUANTIFIED, NO PAIN PRESENT: ICD-10-PCS | Mod: CPTII,S$GLB,, | Performed by: INTERNAL MEDICINE

## 2022-08-10 PROCEDURE — 1101F PT FALLS ASSESS-DOCD LE1/YR: CPT | Mod: CPTII,S$GLB,, | Performed by: INTERNAL MEDICINE

## 2022-08-10 PROCEDURE — 3074F SYST BP LT 130 MM HG: CPT | Mod: CPTII,S$GLB,, | Performed by: INTERNAL MEDICINE

## 2022-08-10 PROCEDURE — 80061 LIPID PANEL: CPT | Performed by: INTERNAL MEDICINE

## 2022-08-10 PROCEDURE — 1160F PR REVIEW ALL MEDS BY PRESCRIBER/CLIN PHARMACIST DOCUMENTED: ICD-10-PCS | Mod: CPTII,S$GLB,, | Performed by: INTERNAL MEDICINE

## 2022-08-10 PROCEDURE — 84443 ASSAY THYROID STIM HORMONE: CPT | Performed by: INTERNAL MEDICINE

## 2022-08-10 PROCEDURE — 3288F FALL RISK ASSESSMENT DOCD: CPT | Mod: CPTII,S$GLB,, | Performed by: INTERNAL MEDICINE

## 2022-08-10 PROCEDURE — 99397 PR PREVENTIVE VISIT,EST,65 & OVER: ICD-10-PCS | Mod: S$GLB,,, | Performed by: INTERNAL MEDICINE

## 2022-08-10 PROCEDURE — 3078F PR MOST RECENT DIASTOLIC BLOOD PRESSURE < 80 MM HG: ICD-10-PCS | Mod: CPTII,S$GLB,, | Performed by: INTERNAL MEDICINE

## 2022-08-10 PROCEDURE — 1126F AMNT PAIN NOTED NONE PRSNT: CPT | Mod: CPTII,S$GLB,, | Performed by: INTERNAL MEDICINE

## 2022-08-10 RX ORDER — FUROSEMIDE 20 MG/1
20 TABLET ORAL DAILY PRN
Qty: 30 TABLET | Refills: 1 | Status: SHIPPED | OUTPATIENT
Start: 2022-08-10 | End: 2023-06-08 | Stop reason: SDUPTHER

## 2022-08-10 NOTE — PROGRESS NOTES
Patient, Brittnee Gupta (MRN #[id number]), presented with a recorded BMI of 35.96 kg/m^2 and a documented comorbidity(s):  - Hypertension  - Hyperlipidemia  - Atrial Fibrillation  to which the severe obesity is a contributing factor. This is consistent with the definition of severe obesity (BMI 35.0-39.9) with comorbidity (ICD-10 E66.01, Z68.35). The patient's severe obesity was monitored, evaluated, addressed and/or treated. This addendum to the medical record is made on 08/10/2022.

## 2022-08-10 NOTE — PROGRESS NOTES
Subjective:      Patient ID: Brittnee Gupta is a 82 y.o. female.    Chief Complaint: Annual Exam    83 yo female with PHM significant for HTN, chronic gout, HLD, asthma, dCHF, frequent urination.    Frequent urination: She was seen by Dr. Nicholson with urogyn. She does not tolerate the oxybutynin so this was discontinued. She was recommended behavioral therapy to help with her frequent urination.     dCHF (HFpEF): Note mild bilateral leg swelling, worsening. Does have SOB at times, but thinks this could be 2/2 to asthma as well. Continue lasix prn. Consider updating echo.     HTN: Office BP is 122/70 . No CP/SOB/lightheadedness/dizziness/palpitations.     Denies any chest pain, shortness of breath, nausea vomiting constipation diarrhea, blood in stool, heartburn    Review of Systems   Constitutional: Negative for chills, fever and weight loss.   HENT: Negative for congestion, ear pain and sore throat.    Eyes: Negative for double vision.   Respiratory: Negative for cough and shortness of breath.    Cardiovascular: Negative for chest pain, palpitations and leg swelling.   Gastrointestinal: Negative for abdominal pain, heartburn, nausea and vomiting.   Skin: Negative for rash.   Neurological: Negative for dizziness, tingling and headaches.   Psychiatric/Behavioral: Negative for depression.          Current Outpatient Medications:     albuterol (PROVENTIL/VENTOLIN HFA) 90 mcg/actuation inhaler, Inhale 2 puffs into the lungs every 6 (six) hours as needed for Wheezing. Rescue, Disp: , Rfl:     allopurinoL (ZYLOPRIM) 300 MG tablet, Take by mouth once daily., Disp: , Rfl:     amLODIPine (NORVASC) 10 MG tablet, Take 10 mg by mouth once daily., Disp: , Rfl:     aspirin (ECOTRIN) 81 MG EC tablet, Take by mouth once daily., Disp: , Rfl:     atorvastatin (LIPITOR) 80 MG tablet, Take 80 mg by mouth once daily., Disp: , Rfl:     azelastine (ASTELIN) 137 mcg (0.1 %) nasal spray, 1 spray by Nasal route 2 (two) times daily.,  Disp: , Rfl:     calcium carbonate (OS-SURJIT) 500 mg calcium (1,250 mg) tablet, Take 1 tablet by mouth once daily., Disp: , Rfl:     diphenhydrAMINE (BENADRYL) 25 mg capsule, Take 25 mg by mouth every 6 (six) hours as needed for Itching., Disp: , Rfl:     docusate sodium (COLACE) 100 MG capsule, Take 100 mg by mouth 2 (two) times daily., Disp: , Rfl:     fluticasone propionate (FLOVENT DISKUS) 50 mcg/actuation DsDv, Inhale into the lungs. Controller, Disp: , Rfl:     loratadine (CLARITIN) 10 mg tablet, Take 10 mg by mouth once daily., Disp: , Rfl:     losartan (COZAAR) 100 MG tablet, Take 100 mg by mouth once daily., Disp: , Rfl:     metoprolol succinate (TOPROL-XL) 25 MG 24 hr tablet, Take 25 mg by mouth once daily., Disp: , Rfl:     montelukast (SINGULAIR) 10 mg tablet, Take 10 mg by mouth every evening., Disp: , Rfl:     omeprazole (PRILOSEC) 20 MG capsule, Take 20 mg by mouth once daily., Disp: , Rfl:     traMADoL (ULTRAM) 50 mg tablet, Take 50 mg by mouth every 6 (six) hours as needed for Pain., Disp: , Rfl:     furosemide (LASIX) 20 MG tablet, Take 1 tablet (20 mg total) by mouth daily as needed (leg swelling)., Disp: 30 tablet, Rfl: 1    No results found for: HGBA1C  No results found for: MICALBCREAT  No results found for: LDLCALC, CHOL, HDL, TRIG    No results found for: NA, K, CL, CO2, GLU, BUN, CREATININE, CALCIUM, PROT, ALBUMIN, BILITOT, ALKPHOS, AST, ALT, ANIONGAP, ESTGFRAFRICA, EGFRNONAA, WBC, HGB, HCT, MCV, PLT, TSH, PSA, PSADIAG, HEPCAB    No results found for: LH, FSH, TOTALTESTOST, PROGESTERONE, ESTRADIOL, QYYVCADI06BT, BQWAMYDV03, FERRITIN, IRON, TRANSFERRIN, TIBC, FESATURATED, ZINC      Past Medical History:   Diagnosis Date    Allergy     Asthma     GERD (gastroesophageal reflux disease)     Hypertension      Past Surgical History:   Procedure Laterality Date    HYSTERECTOMY      MYOMECTOMY       Social History     Social History Narrative    Not on file     History reviewed. No  "pertinent family history.  Vitals:    08/10/22 1001   BP: 122/70   Pulse: 60   SpO2: 97%   Weight: 98 kg (216 lb 1.6 oz)   Height: 5' 5" (1.651 m)   PainSc: 0-No pain     Objective:   Physical Exam  Vitals reviewed.   Constitutional:       Appearance: Normal appearance.   HENT:      Head: Normocephalic.      Right Ear: Tympanic membrane, ear canal and external ear normal.      Left Ear: Tympanic membrane, ear canal and external ear normal.      Nose: Nose normal.      Mouth/Throat:      Mouth: Mucous membranes are moist.      Pharynx: Oropharynx is clear.   Eyes:      Conjunctiva/sclera: Conjunctivae normal.      Pupils: Pupils are equal, round, and reactive to light.   Cardiovascular:      Rate and Rhythm: Normal rate and regular rhythm.      Pulses: Normal pulses.   Pulmonary:      Effort: Pulmonary effort is normal.      Breath sounds: Normal breath sounds.   Abdominal:      General: Abdomen is flat. Bowel sounds are normal.      Palpations: Abdomen is soft.   Musculoskeletal:      Cervical back: Neck supple.   Skin:     General: Skin is warm.   Neurological:      General: No focal deficit present.      Mental Status: She is alert.   Psychiatric:         Mood and Affect: Mood normal.       Assessment/Plan     Brittnee Gupta is a 82 y.o.female with:    Routine general medical examination at a health care facility  -     Hemoglobin A1C; Future; Expected date: 08/10/2022  -     CBC Auto Differential; Future; Expected date: 08/10/2022  -     Comprehensive Metabolic Panel; Future; Expected date: 08/10/2022  -     TSH; Future; Expected date: 08/10/2022  -     Lipid Panel; Future; Expected date: 08/10/2022    Benign essential HTN  -     CBC Auto Differential; Future; Expected date: 08/10/2022  -     TSH; Future; Expected date: 08/10/2022    Chronic diastolic congestive heart failure, NYHA class 1  -     furosemide (LASIX) 20 MG tablet; Take 1 tablet (20 mg total) by mouth daily as needed (leg swelling).  Dispense: 30 " tablet; Refill: 1  -     Lipid Panel; Future; Expected date: 08/10/2022    Hyperlipidemia, unspecified hyperlipidemia type  -     Lipid Panel; Future; Expected date: 08/10/2022    Mixed stress and urge urinary incontinence  - off of oxybutynin, behavioral modification    Chronic gout involving toe without tophus, unspecified cause, unspecified laterality  - cont allopurinol       Chronic conditions status updated as per HPI.  Other than changes above, cont current medications and maintain follow up with specialists.  Return to clinic in Follow up in about 3 months (around 11/10/2022).      Diane Trevino MD  [facility]    Patient Instructions   1. 3 months for well visit or sooner if needed.

## 2022-08-11 ENCOUNTER — TELEPHONE (OUTPATIENT)
Dept: PRIMARY CARE CLINIC | Facility: CLINIC | Age: 82
End: 2022-08-11
Payer: MEDICARE

## 2022-08-11 DIAGNOSIS — I10 BENIGN ESSENTIAL HTN: Primary | ICD-10-CM

## 2022-08-11 NOTE — PROGRESS NOTES
Please let her know that:    We were unable to obtain your blood count as the specimen clotted. We will need a new lab draw from you to see if you are anemic.    Your sugar number (Glucose) is within normal limits, you do not have diabetes.   Your A1c is 5.7%, showing prediabetes.   Rest of your electrolytes are unremarkable.    Your kidney (BUN, Creatinine and GFT) function is unremarkable.   Your liver (AST, ALT) function is unremarkable.  These are the filters in your body for medicine, food and liquids that you ingest.    Your Cholesterol is NORMAL. Continue to focus on low fat, high fiber foods and aerobic exericse (huffing & puffing) for at least 20 minutes most days of the week.    Your Thyroid numbers are normal.    If she is agreeable to repeat CBC, please get her scheduled. CBC is a non fasting lab.

## 2022-08-11 NOTE — TELEPHONE ENCOUNTER
----- Message from Diane Trevino MD sent at 8/11/2022  8:17 AM CDT -----  Regarding: FW: Lab Client Services  Contact: [phone]  We can call her and see if she would be agreeable to get another draw for CBC, if she declines due to the fact that she is a hard stick, we can forgo it for this time.     ----- Message -----  From: Sheree Lancaster MA  Sent: 8/11/2022   8:06 AM CDT  To: Diane Trevino MD  Subject: FW: Lab Client Services                          Per lab, Cbc lab was clotted and requires repeat. Shall I proceed with scheduling??   ----- Message -----  From: Sidra Abdalla  Sent: 8/11/2022   1:50 AM CDT  To: Diane Trevino MD, [facility] Staff  Subject: Lab Client Services                              Good Morning,     My name is Sidra Abdalla I work in the Lab Client Services. We had a problem with some lab work on this patient. If someone from your office could call us at [phone] or ext. [phone] that would be great. Anyone in my department can help.      Thank you

## 2022-08-11 NOTE — TELEPHONE ENCOUNTER
Phone number on file is not a working number. Pt has upcoming appt in Sept. Will also schedule Cbc lab for same day.

## 2022-08-11 NOTE — TELEPHONE ENCOUNTER
----- Message from Diane Trevino MD sent at 8/11/2022  8:22 AM CDT -----  Please let her know that:    We were unable to obtain your blood count as the specimen clotted. We will need a new lab draw from you to see if you are anemic.    Your sugar number (Glucose) is within normal limits, you do not have diabetes.   Your A1c is 5.7%, showing prediabetes.   Rest of your electrolytes are unremarkable.    Your kidney (BUN, Creatinine and GFT) function is unremarkable.   Your liver (AST, ALT) function is unremarkable.  These are the filters in your body for medicine, food and liquids that you ingest.    Your Cholesterol is NORMAL. Continue to focus on low fat, high fiber foods and aerobic exericse (huffing & puffing) for at least 20 minutes most days of the week.    Your Thyroid numbers are normal.    If she is agreeable to repeat CBC, please get her scheduled. CBC is a non fasting lab.

## 2022-08-12 ENCOUNTER — TELEPHONE (OUTPATIENT)
Dept: PRIMARY CARE CLINIC | Facility: CLINIC | Age: 82
End: 2022-08-12
Payer: MEDICARE

## 2022-08-12 RX ORDER — OXYBUTYNIN CHLORIDE 5 MG/1
TABLET ORAL
COMMUNITY
Start: 2022-06-01 | End: 2022-12-08 | Stop reason: ALTCHOICE

## 2022-08-12 NOTE — TELEPHONE ENCOUNTER
----- Message from Jackie Perea sent at 8/12/2022 10:32 AM CDT -----  Name of Who is Calling:REI VELÁSQUEZ [[id number]]              What is the request in detail:Patient is requesting a call back regarding a medication.              Can the clinic reply by [facility]:no              What Number to Call Back if not in [facility]:[phone]

## 2022-09-07 ENCOUNTER — OFFICE VISIT (OUTPATIENT)
Dept: UROGYNECOLOGY | Facility: CLINIC | Age: 82
End: 2022-09-07
Payer: MEDICARE

## 2022-09-07 ENCOUNTER — LAB VISIT (OUTPATIENT)
Dept: LAB | Facility: OTHER | Age: 82
End: 2022-09-07
Attending: INTERNAL MEDICINE
Payer: MEDICARE

## 2022-09-07 ENCOUNTER — TELEPHONE (OUTPATIENT)
Dept: PRIMARY CARE CLINIC | Facility: CLINIC | Age: 82
End: 2022-09-07
Payer: MEDICARE

## 2022-09-07 VITALS
DIASTOLIC BLOOD PRESSURE: 76 MMHG | BODY MASS INDEX: 35.22 KG/M2 | HEIGHT: 65 IN | WEIGHT: 211.38 LBS | SYSTOLIC BLOOD PRESSURE: 124 MMHG

## 2022-09-07 DIAGNOSIS — N39.46 MIXED STRESS AND URGE URINARY INCONTINENCE: Primary | ICD-10-CM

## 2022-09-07 DIAGNOSIS — N39.41 URINARY INCONTINENCE, URGE: ICD-10-CM

## 2022-09-07 DIAGNOSIS — I10 BENIGN ESSENTIAL HTN: ICD-10-CM

## 2022-09-07 LAB
BASOPHILS # BLD AUTO: 0.02 K/UL (ref 0–0.2)
BASOPHILS NFR BLD: 0.4 % (ref 0–1.9)
DIFFERENTIAL METHOD: ABNORMAL
EOSINOPHIL # BLD AUTO: 0 K/UL (ref 0–0.5)
EOSINOPHIL NFR BLD: 0 % (ref 0–8)
ERYTHROCYTE [DISTWIDTH] IN BLOOD BY AUTOMATED COUNT: 14 % (ref 11.5–14.5)
HCT VFR BLD AUTO: 45.4 % (ref 37–48.5)
HGB BLD-MCNC: 14.5 G/DL (ref 12–16)
IMM GRANULOCYTES # BLD AUTO: 0.01 K/UL (ref 0–0.04)
IMM GRANULOCYTES NFR BLD AUTO: 0.2 % (ref 0–0.5)
LYMPHOCYTES # BLD AUTO: 2.4 K/UL (ref 1–4.8)
LYMPHOCYTES NFR BLD: 45.9 % (ref 18–48)
MCH RBC QN AUTO: 31 PG (ref 27–31)
MCHC RBC AUTO-ENTMCNC: 31.9 G/DL (ref 32–36)
MCV RBC AUTO: 97 FL (ref 82–98)
MONOCYTES # BLD AUTO: 0.5 K/UL (ref 0.3–1)
MONOCYTES NFR BLD: 9.4 % (ref 4–15)
NEUTROPHILS # BLD AUTO: 2.3 K/UL (ref 1.8–7.7)
NEUTROPHILS NFR BLD: 44.1 % (ref 38–73)
NRBC BLD-RTO: 0 /100 WBC
PLATELET # BLD AUTO: 222 K/UL (ref 150–450)
PMV BLD AUTO: 11.3 FL (ref 9.2–12.9)
RBC # BLD AUTO: 4.68 M/UL (ref 4–5.4)
WBC # BLD AUTO: 5.23 K/UL (ref 3.9–12.7)

## 2022-09-07 PROCEDURE — 99999 PR PBB SHADOW E&M-EST. PATIENT-LVL III: CPT | Mod: PBBFAC,,, | Performed by: PHYSICIAN ASSISTANT

## 2022-09-07 PROCEDURE — 3078F PR MOST RECENT DIASTOLIC BLOOD PRESSURE < 80 MM HG: ICD-10-PCS | Mod: CPTII,S$GLB,, | Performed by: PHYSICIAN ASSISTANT

## 2022-09-07 PROCEDURE — 3074F SYST BP LT 130 MM HG: CPT | Mod: CPTII,S$GLB,, | Performed by: PHYSICIAN ASSISTANT

## 2022-09-07 PROCEDURE — 3288F FALL RISK ASSESSMENT DOCD: CPT | Mod: CPTII,S$GLB,, | Performed by: PHYSICIAN ASSISTANT

## 2022-09-07 PROCEDURE — 99213 OFFICE O/P EST LOW 20 MIN: CPT | Mod: S$GLB,,, | Performed by: PHYSICIAN ASSISTANT

## 2022-09-07 PROCEDURE — 3288F PR FALLS RISK ASSESSMENT DOCUMENTED: ICD-10-PCS | Mod: CPTII,S$GLB,, | Performed by: PHYSICIAN ASSISTANT

## 2022-09-07 PROCEDURE — 36415 COLL VENOUS BLD VENIPUNCTURE: CPT | Performed by: INTERNAL MEDICINE

## 2022-09-07 PROCEDURE — 99999 PR PBB SHADOW E&M-EST. PATIENT-LVL III: ICD-10-PCS | Mod: PBBFAC,,, | Performed by: PHYSICIAN ASSISTANT

## 2022-09-07 PROCEDURE — 1101F PR PT FALLS ASSESS DOC 0-1 FALLS W/OUT INJ PAST YR: ICD-10-PCS | Mod: CPTII,S$GLB,, | Performed by: PHYSICIAN ASSISTANT

## 2022-09-07 PROCEDURE — 85025 COMPLETE CBC W/AUTO DIFF WBC: CPT | Performed by: INTERNAL MEDICINE

## 2022-09-07 PROCEDURE — 3074F PR MOST RECENT SYSTOLIC BLOOD PRESSURE < 130 MM HG: ICD-10-PCS | Mod: CPTII,S$GLB,, | Performed by: PHYSICIAN ASSISTANT

## 2022-09-07 PROCEDURE — 99213 PR OFFICE/OUTPT VISIT, EST, LEVL III, 20-29 MIN: ICD-10-PCS | Mod: S$GLB,,, | Performed by: PHYSICIAN ASSISTANT

## 2022-09-07 PROCEDURE — 1101F PT FALLS ASSESS-DOCD LE1/YR: CPT | Mod: CPTII,S$GLB,, | Performed by: PHYSICIAN ASSISTANT

## 2022-09-07 PROCEDURE — 3078F DIAST BP <80 MM HG: CPT | Mod: CPTII,S$GLB,, | Performed by: PHYSICIAN ASSISTANT

## 2022-09-07 RX ORDER — MIRABEGRON 50 MG/1
1 TABLET, FILM COATED, EXTENDED RELEASE ORAL NIGHTLY
Qty: 30 TABLET | Refills: 11 | Status: SHIPPED | OUTPATIENT
Start: 2022-09-07 | End: 2022-12-08 | Stop reason: SDUPTHER

## 2022-09-07 NOTE — PROGRESS NOTES
[facility] - UROGYNECOLOGY  [address] [address]  2022     Brittnee Gupta  [id number]  1940    UROGYN FOLLOW-UP  2022     82 y.o. female,   presents for urogyn follow up. She c/o   Chief Complaint   Patient presents with    Follow-up    Urinary Incontinence    Nocturia    .     Last HPI from 2022:  Patient left hearing aids at home.      1)  UI:  (+) JAX < (+) UUI.  No pad use during day--stays dry.  Daytime frequency: Q 3-4 hours.  Nocturia: Yes: Q2h. Goes through 2-3 PPN, +UUI. Has toilet nearby. Does have LE swelling. No water by bed or by bed. Was Rx'd oxybuytnin 5 xl--made dizzy so stopped before could tell if helping.   (--) dysuria,  (--) hematuria,  (--) frequent UTIs.  (+) complete bladder emptying.      2)  POP:  Absent. (--) vaginal bleeding. (--) vaginal discharge. (--) sexually active.  (--) dyspareunia.  (--)  Vaginal dryness.  (--) vaginal estrogen use.      3)  BM:  (+) constipation/straining. Takes stool softener PRN.  (--) chronic diarrhea. (--) hematochezia.  (--) fecal incontinence.  (--) fecal smearing/urgency.  (--) complete evacuation    Changes from last visit:  Patient again left hearing aids at home. She went to PT once then opted for home regimen since transportation is an issue. Says she has been doing home exercises without any improvement in nocturia and urgency. Still wetting pads. Also , still suffering from constipation.     Past Medical History:   Diagnosis Date    Allergy     Asthma     GERD (gastroesophageal reflux disease)     Hypertension        Past Surgical History:   Procedure Laterality Date    HYSTERECTOMY      MYOMECTOMY         No family history on file.    Social History     Socioeconomic History    Marital status:    Tobacco Use    Smoking status: Former     Types: Cigarettes     Quit date: 5/10/1992     Years since quittin.3    Smokeless tobacco: Never   Substance and Sexual Activity    Alcohol  use: Not Currently    Drug use: Never    Sexual activity: Not Currently     Partners: Male       Current Outpatient Medications   Medication Sig Dispense Refill    albuterol (PROVENTIL/VENTOLIN HFA) 90 mcg/actuation inhaler Inhale 2 puffs into the lungs every 6 (six) hours as needed for Wheezing. Rescue      allopurinoL (ZYLOPRIM) 300 MG tablet Take by mouth once daily.      amLODIPine (NORVASC) 10 MG tablet Take 10 mg by mouth once daily.      aspirin (ECOTRIN) 81 MG EC tablet Take by mouth once daily.      atorvastatin (LIPITOR) 80 MG tablet Take 80 mg by mouth once daily.      azelastine (ASTELIN) 137 mcg (0.1 %) nasal spray 1 spray by Nasal route 2 (two) times daily.      calcium carbonate (OS-SURJIT) 500 mg calcium (1,250 mg) tablet Take 1 tablet by mouth once daily.      diphenhydrAMINE (BENADRYL) 25 mg capsule Take 25 mg by mouth every 6 (six) hours as needed for Itching.      docusate sodium (COLACE) 100 MG capsule Take 100 mg by mouth 2 (two) times daily.      fluticasone propionate (FLOVENT DISKUS) 50 mcg/actuation DsDv Inhale into the lungs. Controller      furosemide (LASIX) 20 MG tablet Take 1 tablet (20 mg total) by mouth daily as needed (leg swelling). 30 tablet 1    loratadine (CLARITIN) 10 mg tablet Take 10 mg by mouth once daily.      losartan (COZAAR) 100 MG tablet Take 100 mg by mouth once daily.      metoprolol succinate (TOPROL-XL) 25 MG 24 hr tablet Take 25 mg by mouth once daily.      mirabegron (MYRBETRIQ) 50 mg Tb24 Take 1 tablet (50 mg total) by mouth every evening. 30 tablet 11    montelukast (SINGULAIR) 10 mg tablet Take 10 mg by mouth every evening.      omeprazole (PRILOSEC) 20 MG capsule Take 20 mg by mouth once daily.      oxybutynin (DITROPAN) 5 MG Tab 1 tablet DAILY (route: oral)      traMADoL (ULTRAM) 50 mg tablet Take 50 mg by mouth every 6 (six) hours as needed for Pain.       No current facility-administered medications for this visit.       Review of patient's allergies  "indicates:  No Known Allergies    OB History          6    Para   6    Term   5            AB        Living   5         SAB        IAB        Ectopic        Multiple        Live Births                      Well Woman  No LMP recorded. Patient is postmenopausal.   Pap test: post hyst.  History of abnormal paps: No.  History of STIs:  No  [place] [place]: [place]  Colonoscopy: Date of last: about 4 years ago.  Result:  Normal per report.  Repeat due:  Per PCP.    [place]:  [place] [place]  Result:  Osteopenia, FRAX not sig elevated.  Repeat due:  Per PCP.    ROS  As per HPI.      Physical Exam  /76   Ht 5' 5" (1.651 m)   Wt 95.9 kg (211 lb 6.4 oz)   BMI 35.18 kg/m²   General: alert and oriented, no acute distress  Respiratory: normal respiratory effort  Abd: soft, non-tender, non-distended  Pelvic:  deferred    Impression  1. Mixed stress and urge urinary incontinence  mirabegron (MYRBETRIQ) 50 mg Tb24      2. Urinary incontinence, urge  mirabegron (MYRBETRIQ) 50 mg Tb24        We reviewed the above issues and discussed options for short-term versus long-term management of her problems.     Plan:   1)  Mixed urinary incontinence, urge > stress:    --Empty bladder every 3 hours.  Empty well: wait a minute, lean forward on toilet.    --continue pelvic floor PT exercises.   --URGE: oxybutynin makes her dizzy; Start mirabegron 50 mg nightly.   --STRESS:  Pessary vs. Sling. Discussed pessary for JAX, patient refused.      2)  Nocturia (nighttime urination):   --stop fluids 2 hours before bed.    --no water by bed  --If have leg swelling:  Elevate feet above chest x 1 hour before bed to get excess fluid off.  Can also use support hose (knee highs).                --need to see PCP to discuss LE swelling in setting of HTN--messaged Dr. Trevino so can help address     3)  Constipation:  -- Controlling constipation may help bladder urgency/leakage and fiber may better " "control cholesterol and blood glucose.   -- Start daily fiber.  Start taking 2 tsp of fiber powder (psyllium or other sugar-free powder, ex. Benefiber or Metamucil) or fiber gummies or fiber pills.  Mix in 8 oz of water.  Take x 3-5 days.  Then, increase fiber by 1 tsp every 3-5 days until stool is easy to pass.  Stop and continue at that dose.   **Do not exceed 6 tsps/day.   -- May also use over the counter stool softener (ex Colace) 1-2 x/day.  **AVOID laxatives.  -- Can also try "Natural Vitality Calm" powder or magnesium oxide 250-400 mg per night (helps with sleep, anxiety, and constipation)  -- Drink plenty of water!  -- Get a SQUATTY POTTY    4)  RTC 6-8 weeks, can be virtual for med check.      Omer Raygoza PA-C  Division of Female Pelvic Medicine and Reconstructive Surgery  Department of Obstetrics & Gynecology  [facility] [place], [place]      "

## 2022-09-07 NOTE — TELEPHONE ENCOUNTER
----- Message from Diane Trevino MD sent at 9/7/2022 12:19 PM CDT -----    Your CBC is within normal limits.   Your blood count (Hemoglobin) is unremarkable, and you are not anemic.

## 2022-09-07 NOTE — PATIENT INSTRUCTIONS
"1)  Mixed urinary incontinence, urge > stress:    --Empty bladder every 3 hours.  Empty well: wait a minute, lean forward on toilet.    --watch caffeine intake, especially after 4 PM  --continue pelvic floor PT exercises.   --URGE: oxybutynin makes her dizzy; Start mirabegron 50 mg nightly.   --STRESS:  Pessary vs. Sling. Discussed pessary for JAX, patient refused.      2)  Nocturia (nighttime urination):   --stop fluids 2 hours before bed.    --no water by bed  --If have leg swelling:  Elevate feet above chest x 1 hour before bed to get excess fluid off.  Can also use support hose (knee highs).                --need to see PCP to discuss LE swelling in setting of HTN--messaged Dr. Trevino so can help address     3)  Constipation:  -- Controlling constipation may help bladder urgency/leakage and fiber may better control cholesterol and blood glucose.   -- Start daily fiber.  Start taking 2 tsp of fiber powder (psyllium or other sugar-free powder, ex. Benefiber or Metamucil) or fiber gummies or fiber pills.  Mix in 8 oz of water.  Take x 3-5 days.  Then, increase fiber by 1 tsp every 3-5 days until stool is easy to pass.  Stop and continue at that dose.   **Do not exceed 6 tsps/day.   -- May also use over the counter stool softener (ex Colace) 1-2 x/day.  **AVOID laxatives.  -- Can also try "Natural Vitality Calm" powder or magnesium oxide 250-400 mg per night (helps with sleep, anxiety, and constipation)  -- Drink plenty of water!  -- Get a SQUATTY POTTY    4)  RTC 6 weeks VIRTUAL for med check  "

## 2022-09-07 NOTE — PROGRESS NOTES
Your CBC is within normal limits.   Your blood count (Hemoglobin) is unremarkable, and you are not anemic.

## 2022-09-23 ENCOUNTER — TELEPHONE (OUTPATIENT)
Dept: PRIMARY CARE CLINIC | Facility: CLINIC | Age: 82
End: 2022-09-23
Payer: MEDICARE

## 2022-09-23 RX ORDER — ALLOPURINOL 300 MG/1
TABLET ORAL
Qty: 135 TABLET | Refills: 0 | Status: SHIPPED | OUTPATIENT
Start: 2022-09-23 | End: 2022-12-08 | Stop reason: SDUPTHER

## 2022-09-23 NOTE — TELEPHONE ENCOUNTER
LVM for pt to call back to confirm Medications   Allopurinol 300 mg taking 1 daily.  Pharmacy call for to advice pt. Pt is taking 1 1/2 daily

## 2022-09-23 NOTE — TELEPHONE ENCOUNTER
----- Message from Libby Abdalla sent at 9/23/2022 12:45 PM CDT -----  Contact: Kimberlee/ [place]/ [phone]  Pharmacy would like to know the dosage of allopurinoL (ZYLOPRIM) 300 MG tablet, please call and advise. Patient states she is to be taking 1 tablet and a half .     [facility] [address]   Phone:  [phone]  Fax:  [phone]

## 2022-12-08 ENCOUNTER — OFFICE VISIT (OUTPATIENT)
Dept: PRIMARY CARE CLINIC | Facility: CLINIC | Age: 82
End: 2022-12-08
Payer: MEDICARE

## 2022-12-08 VITALS
SYSTOLIC BLOOD PRESSURE: 126 MMHG | TEMPERATURE: 98 F | HEIGHT: 65 IN | OXYGEN SATURATION: 99 % | RESPIRATION RATE: 18 BRPM | HEART RATE: 72 BPM | DIASTOLIC BLOOD PRESSURE: 74 MMHG | WEIGHT: 212.94 LBS | BODY MASS INDEX: 35.48 KG/M2

## 2022-12-08 DIAGNOSIS — N39.46 MIXED STRESS AND URGE URINARY INCONTINENCE: Primary | ICD-10-CM

## 2022-12-08 DIAGNOSIS — N39.41 URINARY INCONTINENCE, URGE: ICD-10-CM

## 2022-12-08 DIAGNOSIS — I10 BENIGN ESSENTIAL HTN: ICD-10-CM

## 2022-12-08 DIAGNOSIS — M54.9 BACK PAIN, UNSPECIFIED BACK LOCATION, UNSPECIFIED BACK PAIN LATERALITY, UNSPECIFIED CHRONICITY: ICD-10-CM

## 2022-12-08 DIAGNOSIS — I50.32 CHRONIC DIASTOLIC CONGESTIVE HEART FAILURE, NYHA CLASS 1: ICD-10-CM

## 2022-12-08 DIAGNOSIS — M25.569 KNEE PAIN, UNSPECIFIED CHRONICITY, UNSPECIFIED LATERALITY: ICD-10-CM

## 2022-12-08 DIAGNOSIS — M1A.9XX0 CHRONIC GOUT INVOLVING TOE WITHOUT TOPHUS, UNSPECIFIED CAUSE, UNSPECIFIED LATERALITY: ICD-10-CM

## 2022-12-08 DIAGNOSIS — J45.909 MODERATE ASTHMA, UNSPECIFIED WHETHER COMPLICATED, UNSPECIFIED WHETHER PERSISTENT: ICD-10-CM

## 2022-12-08 PROCEDURE — 1101F PT FALLS ASSESS-DOCD LE1/YR: CPT | Mod: CPTII,S$GLB,, | Performed by: INTERNAL MEDICINE

## 2022-12-08 PROCEDURE — 99999 PR PBB SHADOW E&M-EST. PATIENT-LVL V: CPT | Mod: PBBFAC,,, | Performed by: INTERNAL MEDICINE

## 2022-12-08 PROCEDURE — 3074F PR MOST RECENT SYSTOLIC BLOOD PRESSURE < 130 MM HG: ICD-10-PCS | Mod: CPTII,S$GLB,, | Performed by: INTERNAL MEDICINE

## 2022-12-08 PROCEDURE — 1125F PR PAIN SEVERITY QUANTIFIED, PAIN PRESENT: ICD-10-PCS | Mod: CPTII,S$GLB,, | Performed by: INTERNAL MEDICINE

## 2022-12-08 PROCEDURE — 99214 PR OFFICE/OUTPT VISIT, EST, LEVL IV, 30-39 MIN: ICD-10-PCS | Mod: S$GLB,,, | Performed by: INTERNAL MEDICINE

## 2022-12-08 PROCEDURE — 1160F RVW MEDS BY RX/DR IN RCRD: CPT | Mod: CPTII,S$GLB,, | Performed by: INTERNAL MEDICINE

## 2022-12-08 PROCEDURE — 99214 OFFICE O/P EST MOD 30 MIN: CPT | Mod: S$GLB,,, | Performed by: INTERNAL MEDICINE

## 2022-12-08 PROCEDURE — 1125F AMNT PAIN NOTED PAIN PRSNT: CPT | Mod: CPTII,S$GLB,, | Performed by: INTERNAL MEDICINE

## 2022-12-08 PROCEDURE — 3078F PR MOST RECENT DIASTOLIC BLOOD PRESSURE < 80 MM HG: ICD-10-PCS | Mod: CPTII,S$GLB,, | Performed by: INTERNAL MEDICINE

## 2022-12-08 PROCEDURE — 99999 PR PBB SHADOW E&M-EST. PATIENT-LVL V: ICD-10-PCS | Mod: PBBFAC,,, | Performed by: INTERNAL MEDICINE

## 2022-12-08 PROCEDURE — 3288F FALL RISK ASSESSMENT DOCD: CPT | Mod: CPTII,S$GLB,, | Performed by: INTERNAL MEDICINE

## 2022-12-08 PROCEDURE — 3078F DIAST BP <80 MM HG: CPT | Mod: CPTII,S$GLB,, | Performed by: INTERNAL MEDICINE

## 2022-12-08 PROCEDURE — 1101F PR PT FALLS ASSESS DOC 0-1 FALLS W/OUT INJ PAST YR: ICD-10-PCS | Mod: CPTII,S$GLB,, | Performed by: INTERNAL MEDICINE

## 2022-12-08 PROCEDURE — 1159F PR MEDICATION LIST DOCUMENTED IN MEDICAL RECORD: ICD-10-PCS | Mod: CPTII,S$GLB,, | Performed by: INTERNAL MEDICINE

## 2022-12-08 PROCEDURE — 1159F MED LIST DOCD IN RCRD: CPT | Mod: CPTII,S$GLB,, | Performed by: INTERNAL MEDICINE

## 2022-12-08 PROCEDURE — 1160F PR REVIEW ALL MEDS BY PRESCRIBER/CLIN PHARMACIST DOCUMENTED: ICD-10-PCS | Mod: CPTII,S$GLB,, | Performed by: INTERNAL MEDICINE

## 2022-12-08 PROCEDURE — 3288F PR FALLS RISK ASSESSMENT DOCUMENTED: ICD-10-PCS | Mod: CPTII,S$GLB,, | Performed by: INTERNAL MEDICINE

## 2022-12-08 PROCEDURE — 3074F SYST BP LT 130 MM HG: CPT | Mod: CPTII,S$GLB,, | Performed by: INTERNAL MEDICINE

## 2022-12-08 RX ORDER — FLUTICASONE PROPIONATE AND SALMETEROL XINAFOATE 45; 21 UG/1; UG/1
2 AEROSOL, METERED RESPIRATORY (INHALATION) EVERY 12 HOURS
Qty: 12 G | Refills: 4 | Status: SHIPPED | OUTPATIENT
Start: 2022-12-08 | End: 2023-12-08

## 2022-12-08 RX ORDER — NAPROXEN 500 MG/1
500 TABLET ORAL 2 TIMES DAILY WITH MEALS
Qty: 90 TABLET | Refills: 1 | Status: SHIPPED | OUTPATIENT
Start: 2022-12-08

## 2022-12-08 RX ORDER — MIRABEGRON 50 MG/1
1 TABLET, FILM COATED, EXTENDED RELEASE ORAL NIGHTLY
Qty: 90 TABLET | Refills: 1 | Status: SHIPPED | OUTPATIENT
Start: 2022-12-08 | End: 2023-09-27 | Stop reason: SDUPTHER

## 2022-12-08 NOTE — PATIENT INSTRUCTIONS
Make sure to fill your mirabegron (MYRBETRIQ) from your pharmacy so help with your bladder issue.   6 months for well visit or sooner if needed.

## 2022-12-08 NOTE — PROGRESS NOTES
Subjective:      Patient ID: Brittnee Gupta is a 82 y.o. female.    Chief Complaint: Follow-up    83 yo female with PHM significant for HTN, chronic gout, HLD, asthma, dCHF, frequent urination.     Frequent urination: She was seen by Dr. Nicholson with urogyn. She does not tolerate the oxybutynin so this was discontinued and was started on myrbetriq but has not been noncompliant. She was recommended behavioral therapy to help with her frequent urination.      dCHF (HFpEF): Note mild bilateral leg swelling, worsening. Does have SOB at times, but thinks this could be 2/2 to asthma as well. Continue lasix prn. Consider updating echo.      HTN: Office BP is 126/74 . No CP/SOB/lightheadedness/dizziness/palpitations.     Knee/back pain: Reports chronic knee and back pain. Does not want pain medication. Will consider naproxen at this time. Order PT at this time.     Denies any chest pain, shortness of breath, nausea vomiting constipation diarrhea, blood in stool, heartburn    Review of Systems   Constitutional:  Negative for chills, fever and weight loss.   HENT:  Negative for congestion, ear pain and sore throat.    Eyes:  Negative for double vision.   Respiratory:  Negative for cough and shortness of breath.    Cardiovascular:  Negative for chest pain, palpitations and leg swelling.   Gastrointestinal:  Negative for abdominal pain, heartburn, nausea and vomiting.   Skin:  Negative for rash.   Neurological:  Negative for dizziness, tingling and headaches.   Psychiatric/Behavioral:  Negative for depression.        Current Outpatient Medications:     albuterol (PROVENTIL/VENTOLIN HFA) 90 mcg/actuation inhaler, Inhale 2 puffs into the lungs every 6 (six) hours as needed for Wheezing. Rescue, Disp: , Rfl:     amLODIPine (NORVASC) 10 MG tablet, Take 10 mg by mouth once daily., Disp: , Rfl:     aspirin (ECOTRIN) 81 MG EC tablet, Take by mouth once daily., Disp: , Rfl:     atorvastatin (LIPITOR) 80 MG tablet, Take 80 mg by mouth  once daily., Disp: , Rfl:     azelastine (ASTELIN) 137 mcg (0.1 %) nasal spray, 1 spray by Nasal route 2 (two) times daily., Disp: , Rfl:     calcium carbonate (OS-SURJIT) 500 mg calcium (1,250 mg) tablet, Take 1 tablet by mouth once daily., Disp: , Rfl:     diphenhydrAMINE (BENADRYL) 25 mg capsule, Take 25 mg by mouth every 6 (six) hours as needed for Itching., Disp: , Rfl:     docusate sodium (COLACE) 100 MG capsule, Take 100 mg by mouth 2 (two) times daily., Disp: , Rfl:     furosemide (LASIX) 20 MG tablet, Take 1 tablet (20 mg total) by mouth daily as needed (leg swelling)., Disp: 30 tablet, Rfl: 1    loratadine (CLARITIN) 10 mg tablet, Take 10 mg by mouth once daily., Disp: , Rfl:     losartan (COZAAR) 100 MG tablet, Take 100 mg by mouth once daily., Disp: , Rfl:     metoprolol succinate (TOPROL-XL) 25 MG 24 hr tablet, Take 25 mg by mouth once daily., Disp: , Rfl:     montelukast (SINGULAIR) 10 mg tablet, Take 10 mg by mouth every evening., Disp: , Rfl:     omeprazole (PRILOSEC) 20 MG capsule, Take 20 mg by mouth once daily., Disp: , Rfl:     traMADoL (ULTRAM) 50 mg tablet, Take 50 mg by mouth every 6 (six) hours as needed for Pain., Disp: , Rfl:     allopurinoL (ZYLOPRIM) 300 MG tablet, Take by mouth once daily., Disp: , Rfl:     fluticasone propion-salmeterol 45-21 mcg/dose (ADVAIR HFA) 45-21 mcg/actuation HFAA inhaler, Inhale 2 puffs into the lungs every 12 (twelve) hours. Controller, Disp: 12 g, Rfl: 4    mirabegron (MYRBETRIQ) 50 mg Tb24, Take 1 tablet (50 mg total) by mouth every evening., Disp: 90 tablet, Rfl: 1    naproxen (NAPROSYN) 500 MG tablet, Take 1 tablet (500 mg total) by mouth 2 (two) times daily with meals., Disp: 90 tablet, Rfl: 1    Lab Results   Component Value Date    HGBA1C 5.7 (H) 08/10/2022     No results found for: MICALBCREAT  Lab Results   Component Value Date    LDLCALC 50.8 (L) 08/10/2022    CHOL 118 (L) 08/10/2022    HDL 54 08/10/2022    TRIG 66 08/10/2022       Lab Results  "  Component Value Date     08/10/2022    K 3.8 08/10/2022     08/10/2022    CO2 26 08/10/2022     08/10/2022    BUN 13 08/10/2022    CREATININE 0.8 08/10/2022    CALCIUM 9.9 08/10/2022    PROT 7.9 08/10/2022    ALBUMIN 4.0 08/10/2022    BILITOT 1.3 (H) 08/10/2022    ALKPHOS 127 08/10/2022    AST 20 08/10/2022    ALT 16 08/10/2022    ANIONGAP 12 08/10/2022    WBC 5.23 09/07/2022    HGB 14.5 09/07/2022    HCT 45.4 09/07/2022    MCV 97 09/07/2022     09/07/2022    TSH 1.074 08/10/2022       No results found for: LH, FSH, TOTALTESTOST, PROGESTERONE, ESTRADIOL, KEMHULGD29KC, AVZKWLDL40, FERRITIN, IRON, TRANSFERRIN, TIBC, FESATURATED, ZINC      Past Medical History:   Diagnosis Date    Allergy     Asthma     GERD (gastroesophageal reflux disease)     Hypertension      Past Surgical History:   Procedure Laterality Date    HYSTERECTOMY      MYOMECTOMY       Social History     Social History Narrative    Not on file     History reviewed. No pertinent family history.  Vitals:    12/08/22 1114   BP: 126/74   Pulse: 72   Resp: 18   Temp: 98 °F (36.7 °C)   SpO2: 99%   Weight: 96.6 kg (212 lb 15.4 oz)   Height: 5' 5" (1.651 m)   PainSc:   6     Objective:   Physical Exam  Vitals reviewed.   Constitutional:       Appearance: Normal appearance.   HENT:      Head: Normocephalic.   Eyes:      Pupils: Pupils are equal, round, and reactive to light.   Cardiovascular:      Rate and Rhythm: Normal rate.      Pulses: Normal pulses.      Heart sounds: Normal heart sounds.   Pulmonary:      Effort: Pulmonary effort is normal.      Breath sounds: Normal breath sounds.   Abdominal:      General: Bowel sounds are normal.      Palpations: Abdomen is soft.   Musculoskeletal:         General: Normal range of motion.   Skin:     General: Skin is warm.   Neurological:      General: No focal deficit present.      Mental Status: She is alert. Mental status is at baseline.   Psychiatric:         Mood and Affect: Mood normal. "     Assessment:     1. Mixed stress and urge urinary incontinence    2. Back pain, unspecified back location, unspecified back pain laterality, unspecified chronicity    3. Chronic diastolic congestive heart failure, NYHA class 1    4. Benign essential HTN    5. 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 Moderate asthma, unspecified whether complicated, unspecified whether persistent    7. Urinary incontinence, urge    8. Knee pain, unspecified chronicity, unspecified laterality      Plan:     Orders Placed This Encounter    Ambulatory referral/consult to Physical/Occupational Therapy    fluticasone propion-salmeterol 45-21 mcg/dose (ADVAIR HFA) 45-21 mcg/actuation HFAA inhaler    mirabegron (MYRBETRIQ) 50 mg Tb24    naproxen (NAPROSYN) 500 MG tablet       Patient Instructions   Make sure to fill your mirabegron (MYRBETRIQ) from your pharmacy so help with your bladder issue.   6 months for well visit or sooner if needed.   All of your core healthy metrics are met.

## 2022-12-08 NOTE — PROGRESS NOTES
Patient Brittnee Gupta, MRN [id number], was prescribed Advair during her visit on 12/8/22 in order to treat chronic obstructive pulmonary disease (ICD 10 J44.9). This addendum is made to the medical record on 12/08/2022.

## 2022-12-28 ENCOUNTER — NURSE TRIAGE (OUTPATIENT)
Dept: ADMINISTRATIVE | Facility: CLINIC | Age: 82
End: 2022-12-28
Payer: MEDICARE

## 2022-12-28 ENCOUNTER — TELEPHONE (OUTPATIENT)
Dept: PRIMARY CARE CLINIC | Facility: CLINIC | Age: 82
End: 2022-12-28
Payer: MEDICARE

## 2022-12-28 NOTE — TELEPHONE ENCOUNTER
Transferred from , but caller disconnected. No answer x 2. Per : Taking naproxen but making her dizzy.   Reason for Disposition   No answer.  First attempt to contact caller.  Follow-up call scheduled within 15 minutes.    Protocols used: No Contact or Duplicate Contact Call-A-

## 2023-01-10 ENCOUNTER — OFFICE VISIT (OUTPATIENT)
Dept: UROGYNECOLOGY | Facility: CLINIC | Age: 83
End: 2023-01-10
Payer: MEDICARE

## 2023-01-10 VITALS
SYSTOLIC BLOOD PRESSURE: 121 MMHG | WEIGHT: 213.88 LBS | BODY MASS INDEX: 35.63 KG/M2 | HEIGHT: 65 IN | DIASTOLIC BLOOD PRESSURE: 70 MMHG

## 2023-01-10 DIAGNOSIS — R35.0 FREQUENT URINATION: ICD-10-CM

## 2023-01-10 DIAGNOSIS — R35.1 NOCTURIA: ICD-10-CM

## 2023-01-10 DIAGNOSIS — N39.41 URINARY INCONTINENCE, URGE: Primary | ICD-10-CM

## 2023-01-10 PROCEDURE — 1101F PT FALLS ASSESS-DOCD LE1/YR: CPT | Mod: CPTII,S$GLB,, | Performed by: OBSTETRICS & GYNECOLOGY

## 2023-01-10 PROCEDURE — 3078F PR MOST RECENT DIASTOLIC BLOOD PRESSURE < 80 MM HG: ICD-10-PCS | Mod: CPTII,S$GLB,, | Performed by: OBSTETRICS & GYNECOLOGY

## 2023-01-10 PROCEDURE — 1125F AMNT PAIN NOTED PAIN PRSNT: CPT | Mod: CPTII,S$GLB,, | Performed by: OBSTETRICS & GYNECOLOGY

## 2023-01-10 PROCEDURE — 3074F SYST BP LT 130 MM HG: CPT | Mod: CPTII,S$GLB,, | Performed by: OBSTETRICS & GYNECOLOGY

## 2023-01-10 PROCEDURE — 99213 PR OFFICE/OUTPT VISIT, EST, LEVL III, 20-29 MIN: ICD-10-PCS | Mod: S$GLB,,, | Performed by: OBSTETRICS & GYNECOLOGY

## 2023-01-10 PROCEDURE — 1159F MED LIST DOCD IN RCRD: CPT | Mod: CPTII,S$GLB,, | Performed by: OBSTETRICS & GYNECOLOGY

## 2023-01-10 PROCEDURE — 3288F FALL RISK ASSESSMENT DOCD: CPT | Mod: CPTII,S$GLB,, | Performed by: OBSTETRICS & GYNECOLOGY

## 2023-01-10 PROCEDURE — 3288F PR FALLS RISK ASSESSMENT DOCUMENTED: ICD-10-PCS | Mod: CPTII,S$GLB,, | Performed by: OBSTETRICS & GYNECOLOGY

## 2023-01-10 PROCEDURE — 3078F DIAST BP <80 MM HG: CPT | Mod: CPTII,S$GLB,, | Performed by: OBSTETRICS & GYNECOLOGY

## 2023-01-10 PROCEDURE — 1101F PR PT FALLS ASSESS DOC 0-1 FALLS W/OUT INJ PAST YR: ICD-10-PCS | Mod: CPTII,S$GLB,, | Performed by: OBSTETRICS & GYNECOLOGY

## 2023-01-10 PROCEDURE — 99999 PR PBB SHADOW E&M-EST. PATIENT-LVL III: ICD-10-PCS | Mod: PBBFAC,,, | Performed by: OBSTETRICS & GYNECOLOGY

## 2023-01-10 PROCEDURE — 3074F PR MOST RECENT SYSTOLIC BLOOD PRESSURE < 130 MM HG: ICD-10-PCS | Mod: CPTII,S$GLB,, | Performed by: OBSTETRICS & GYNECOLOGY

## 2023-01-10 PROCEDURE — 99213 OFFICE O/P EST LOW 20 MIN: CPT | Mod: S$GLB,,, | Performed by: OBSTETRICS & GYNECOLOGY

## 2023-01-10 PROCEDURE — 1159F PR MEDICATION LIST DOCUMENTED IN MEDICAL RECORD: ICD-10-PCS | Mod: CPTII,S$GLB,, | Performed by: OBSTETRICS & GYNECOLOGY

## 2023-01-10 PROCEDURE — 1160F PR REVIEW ALL MEDS BY PRESCRIBER/CLIN PHARMACIST DOCUMENTED: ICD-10-PCS | Mod: CPTII,S$GLB,, | Performed by: OBSTETRICS & GYNECOLOGY

## 2023-01-10 PROCEDURE — 1160F RVW MEDS BY RX/DR IN RCRD: CPT | Mod: CPTII,S$GLB,, | Performed by: OBSTETRICS & GYNECOLOGY

## 2023-01-10 PROCEDURE — 1125F PR PAIN SEVERITY QUANTIFIED, PAIN PRESENT: ICD-10-PCS | Mod: CPTII,S$GLB,, | Performed by: OBSTETRICS & GYNECOLOGY

## 2023-01-10 PROCEDURE — 99999 PR PBB SHADOW E&M-EST. PATIENT-LVL III: CPT | Mod: PBBFAC,,, | Performed by: OBSTETRICS & GYNECOLOGY

## 2023-01-10 NOTE — PROGRESS NOTES
"Chief Complaint   Patient presents with    Urinary Incontinence   Nocturia     HPI: Patient is a 82 y.o. female  presents today to follow-up about her urinary symptoms. Waking up 4 times per night. Reports she used to wake 6 times. She reports that she wears a pad and wakes up to a "soaking wet" pad. She reports daytime urgency incontinence, but feels her daytime symptoms are less than her nighttime. She voids small amount every two hours throughout the day. She has done some home PT, had five visits. Patient still doing the exercises she was shown but she states that they don't help and are causing back pain.   Patient previously tried Oxybutynin XL 5 mg but stopped as it made her feel dizzy. Patient switched to Myrbetriq 50 mg 2022. States that it "helps a little". Taking the Myrbetriq in the evening. States that she ay drink up to bedtime depending on if she has evening medication to take.   She denies leakage of urine with coughing, sneezing or laughing.     Patient drinks 4 bottles of water (12 ounces).     REVIEW OF SYSTEMS:  A full 14-point ROS was performed and was significant for those  mentioned in the HPI.    The following portions of the patient's history were reviewed and updated as appropriate: allergies, current medications, past medical history, past surgical history and problem list.    PHYSICAL EXAMINATION    Vitals:    01/10/23 1138   BP: 121/70        General: Healthy in appearance, Well nourished, Affect Normal, NAD.      No visits with results within 1 Day(s) from this visit.   Latest known visit with results is:   Patient Outreach on 2022   Component Date Value Ref Range Status    BMD Recommendation External 2022 Repeat BMD in 3 years   Final        ASSESSMENT & PLAN:  Urinary incontinence, urge  -     Simple urodynamics w/ cysto; Future    Nocturia    Frequent urination       81 yo with OAB/urgency urinary incontinence/ nocturia presented for a follow-up visit. She has " been taking Myrbetriq for approximately three months and notices a small improvement in symptoms. Recommended that we perform additional testing to better understand her symptoms. She will be scheduled for both cystoscopy and urodynamics. Reviewed that we can add an anticholinergic to the Mybetriq or change the medication to Gemtessa (virbegron) or consider thiid line optoins such as botox, sacral neumodulation, and percutaneous tibial nerve stimulation. Handout on OAB and treatment options provided to the patient.   Patient did not want to immediately stop the Myrbetriq. She does not want to add another medication at this time either. She will plan to complete the remaining tablets and return for the additional tests at which time we will review her options once more.     All questions were answered today. The patient was encouraged to contact the office as needed with any additional questions or concerns.     Total time spent on visit was 20 minutes.  This includes face to face time and non-face to face time preparing to see the patient (eg, review of tests), Obtaining and/or reviewing separately obtained history, Documenting clinical information in the electronic or other health record, Independently interpreting resultsand communicating results to the patient/family/caregiver, or Care coordination.    Isabelle Wilkins MD

## 2023-02-03 ENCOUNTER — PES CALL (OUTPATIENT)
Dept: ADMINISTRATIVE | Facility: CLINIC | Age: 83
End: 2023-02-03
Payer: MEDICARE

## 2023-02-06 ENCOUNTER — TELEPHONE (OUTPATIENT)
Dept: PRIMARY CARE CLINIC | Facility: CLINIC | Age: 83
End: 2023-02-06
Payer: MEDICARE

## 2023-02-06 DIAGNOSIS — Z12.31 SCREENING MAMMOGRAM, ENCOUNTER FOR: Primary | ICD-10-CM

## 2023-02-06 NOTE — TELEPHONE ENCOUNTER
----- Message from Paty Alcazar sent at 2/6/2023  2:53 PM CST -----  Contact: [phone]  Type:  Mammogram    Caller is requesting to schedule their annual mammogram appointment.  Order is not listed in EPIC.  Please enter order and contact patient to schedule.  Name of Caller:Pt  Where would they like the mammogram performed?[facility]  Would the patient rather a call back or a response via [facility]? Call back  Best Call Back Number:[phone]  Additional Information: pt left phone # [phone] to send info to [facility]; pt has documents to fill out regarding transportation because pt doesn't drive anymore, she's going to bring docs when she has an appt on Wednesday

## 2023-02-08 ENCOUNTER — OFFICE VISIT (OUTPATIENT)
Dept: UROGYNECOLOGY | Facility: CLINIC | Age: 83
End: 2023-02-08
Payer: MEDICARE

## 2023-02-08 VITALS
BODY MASS INDEX: 35.26 KG/M2 | DIASTOLIC BLOOD PRESSURE: 70 MMHG | HEIGHT: 65 IN | SYSTOLIC BLOOD PRESSURE: 142 MMHG | WEIGHT: 211.63 LBS

## 2023-02-08 DIAGNOSIS — N39.41 URINARY INCONTINENCE, URGE: Primary | ICD-10-CM

## 2023-02-08 DIAGNOSIS — R35.1 NOCTURIA: ICD-10-CM

## 2023-02-08 PROCEDURE — 3077F PR MOST RECENT SYSTOLIC BLOOD PRESSURE >= 140 MM HG: ICD-10-PCS | Mod: CPTII,S$GLB,, | Performed by: OBSTETRICS & GYNECOLOGY

## 2023-02-08 PROCEDURE — 1126F AMNT PAIN NOTED NONE PRSNT: CPT | Mod: CPTII,S$GLB,, | Performed by: OBSTETRICS & GYNECOLOGY

## 2023-02-08 PROCEDURE — 1101F PR PT FALLS ASSESS DOC 0-1 FALLS W/OUT INJ PAST YR: ICD-10-PCS | Mod: CPTII,S$GLB,, | Performed by: OBSTETRICS & GYNECOLOGY

## 2023-02-08 PROCEDURE — 99499 NO LOS: ICD-10-PCS | Mod: S$GLB,,, | Performed by: OBSTETRICS & GYNECOLOGY

## 2023-02-08 PROCEDURE — 3078F PR MOST RECENT DIASTOLIC BLOOD PRESSURE < 80 MM HG: ICD-10-PCS | Mod: CPTII,S$GLB,, | Performed by: OBSTETRICS & GYNECOLOGY

## 2023-02-08 PROCEDURE — 3077F SYST BP >= 140 MM HG: CPT | Mod: CPTII,S$GLB,, | Performed by: OBSTETRICS & GYNECOLOGY

## 2023-02-08 PROCEDURE — 3288F FALL RISK ASSESSMENT DOCD: CPT | Mod: CPTII,S$GLB,, | Performed by: OBSTETRICS & GYNECOLOGY

## 2023-02-08 PROCEDURE — 3078F DIAST BP <80 MM HG: CPT | Mod: CPTII,S$GLB,, | Performed by: OBSTETRICS & GYNECOLOGY

## 2023-02-08 PROCEDURE — 1101F PT FALLS ASSESS-DOCD LE1/YR: CPT | Mod: CPTII,S$GLB,, | Performed by: OBSTETRICS & GYNECOLOGY

## 2023-02-08 PROCEDURE — 1126F PR PAIN SEVERITY QUANTIFIED, NO PAIN PRESENT: ICD-10-PCS | Mod: CPTII,S$GLB,, | Performed by: OBSTETRICS & GYNECOLOGY

## 2023-02-08 PROCEDURE — 3288F PR FALLS RISK ASSESSMENT DOCUMENTED: ICD-10-PCS | Mod: CPTII,S$GLB,, | Performed by: OBSTETRICS & GYNECOLOGY

## 2023-02-08 PROCEDURE — 99499 UNLISTED E&M SERVICE: CPT | Mod: S$GLB,,, | Performed by: OBSTETRICS & GYNECOLOGY

## 2023-02-08 NOTE — PROGRESS NOTES
Chief Complaint   Patient presents with    Follow up        HPI: Patient is a 82 y.o. female  presented today for a visit. Patient was incorrectly scheduled for a follow up visit instead of urodynamic testing and cystoscopy. Offered 1pm testing time but patient declined as daughter needed to return to work. Patient was re-scheduled to the correct appointment type and a convenient time for her and her daughter. Apologized for confusion and scheduling error.        Isabelle iWlkins MD

## 2023-02-14 RX ORDER — OMEPRAZOLE 20 MG/1
20 CAPSULE, DELAYED RELEASE ORAL DAILY
Qty: 90 CAPSULE | Refills: 0 | Status: SHIPPED | OUTPATIENT
Start: 2023-02-14 | End: 2023-05-16

## 2023-02-14 NOTE — TELEPHONE ENCOUNTER
----- Message from Frances Simpson sent at 2/14/2023  2:01 PM CST -----  Regarding: medication  Contact: pt  Pt calling in regards to medication    Needs refill: omeprazole (PRILOSEC) 20 MG capsule    Pharmacy: Preferred : [facility] - [facility] [address] , phone [phone]     Pt would like to up the Quantify to 90 day Supply, Thank You    Please call pt [phone]

## 2023-02-22 NOTE — TELEPHONE ENCOUNTER
----- Message from Roberta Tran sent at 2/22/2023  3:44 PM CST -----  Contact: self/[phone]  Requesting an RX refill or new RX.  Is this a refill or new RX: refill  RX name and strength (copy/paste from chart):  allopurinoL (ZYLOPRIM) 300 MG tablet    Sig: Take by mouth once daily.  Is this a 30 day or 90 day RX: 90  Pharmacy name and phone # (copy/paste from chart):    [facility] - [facility] [address]  [address]  [place] [address]  Phone: [phone] Fax: [phone]  The doctors have asked that we provide their patients with the following 2 reminders -- prescription refills can take up to 72 hours, and a friendly reminder that in the future you can use your [facility] account to request refills: call back    Please advise

## 2023-02-23 RX ORDER — ALLOPURINOL 300 MG/1
300 TABLET ORAL DAILY
Qty: 90 TABLET | Refills: 0 | Status: SHIPPED | OUTPATIENT
Start: 2023-02-23 | End: 2023-03-14 | Stop reason: SDUPTHER

## 2023-02-28 LAB
BCS RECOMMENDATION EXT: NORMAL
BCS RECOMMENDATION EXT: NORMAL

## 2023-03-02 ENCOUNTER — TELEPHONE (OUTPATIENT)
Dept: PRIMARY CARE CLINIC | Facility: CLINIC | Age: 83
End: 2023-03-02
Payer: MEDICARE

## 2023-03-03 ENCOUNTER — TELEPHONE (OUTPATIENT)
Dept: PRIMARY CARE CLINIC | Facility: CLINIC | Age: 83
End: 2023-03-03
Payer: MEDICARE

## 2023-03-07 ENCOUNTER — PROCEDURE VISIT (OUTPATIENT)
Dept: UROGYNECOLOGY | Facility: CLINIC | Age: 83
End: 2023-03-07
Payer: MEDICARE

## 2023-03-07 ENCOUNTER — PATIENT OUTREACH (OUTPATIENT)
Dept: ADMINISTRATIVE | Facility: HOSPITAL | Age: 83
End: 2023-03-07
Payer: MEDICARE

## 2023-03-07 VITALS
BODY MASS INDEX: 35.95 KG/M2 | DIASTOLIC BLOOD PRESSURE: 82 MMHG | SYSTOLIC BLOOD PRESSURE: 142 MMHG | WEIGHT: 210.56 LBS | HEIGHT: 64 IN

## 2023-03-07 DIAGNOSIS — R35.1 NOCTURIA: Primary | ICD-10-CM

## 2023-03-07 DIAGNOSIS — N39.41 URINARY INCONTINENCE, URGE: ICD-10-CM

## 2023-03-07 PROCEDURE — 51784 PR ANAL/URINARY MUSCLE STUDY: ICD-10-PCS | Mod: HCNC,51,S$GLB, | Performed by: OBSTETRICS & GYNECOLOGY

## 2023-03-07 PROCEDURE — 51728 CYSTOMETROGRAM W/VP: CPT | Mod: HCNC,S$GLB,, | Performed by: OBSTETRICS & GYNECOLOGY

## 2023-03-07 PROCEDURE — 51784 ANAL/URINARY MUSCLE STUDY: CPT | Mod: HCNC,51,S$GLB, | Performed by: OBSTETRICS & GYNECOLOGY

## 2023-03-07 PROCEDURE — 51728 PR COMPLEX CYSTOMETROGRAM VOIDING PRESSURE STUDIES: ICD-10-PCS | Mod: HCNC,S$GLB,, | Performed by: OBSTETRICS & GYNECOLOGY

## 2023-03-07 PROCEDURE — 51741 PR UROFLOWMETRY, COMPLEX: ICD-10-PCS | Mod: 51,S$GLB,, | Performed by: OBSTETRICS & GYNECOLOGY

## 2023-03-07 PROCEDURE — 52000 PR CYSTOURETHROSCOPY: ICD-10-PCS | Mod: HCNC,59,S$GLB, | Performed by: OBSTETRICS & GYNECOLOGY

## 2023-03-07 PROCEDURE — 51741 ELECTRO-UROFLOWMETRY FIRST: CPT | Mod: 51,S$GLB,, | Performed by: OBSTETRICS & GYNECOLOGY

## 2023-03-07 PROCEDURE — 51797 PR VOIDING PRESS STUDY INTRA-ABDOMINAL VOID: ICD-10-PCS | Mod: HCNC,S$GLB,, | Performed by: OBSTETRICS & GYNECOLOGY

## 2023-03-07 PROCEDURE — 51797 INTRAABDOMINAL PRESSURE TEST: CPT | Mod: HCNC,S$GLB,, | Performed by: OBSTETRICS & GYNECOLOGY

## 2023-03-07 PROCEDURE — 52000 CYSTOURETHROSCOPY: CPT | Mod: HCNC,59,S$GLB, | Performed by: OBSTETRICS & GYNECOLOGY

## 2023-03-07 PROCEDURE — 81002 PR URINALYSIS NONAUTO W/O SCOPE: ICD-10-PCS | Mod: HCNC,S$GLB,, | Performed by: OBSTETRICS & GYNECOLOGY

## 2023-03-07 PROCEDURE — 81002 URINALYSIS NONAUTO W/O SCOPE: CPT | Mod: HCNC,S$GLB,, | Performed by: OBSTETRICS & GYNECOLOGY

## 2023-03-07 RX ORDER — CIPROFLOXACIN 500 MG/1
500 TABLET ORAL
Status: COMPLETED | OUTPATIENT
Start: 2023-03-07 | End: 2023-03-07

## 2023-03-07 RX ORDER — LIDOCAINE HYDROCHLORIDE 20 MG/ML
JELLY TOPICAL ONCE
Status: COMPLETED | OUTPATIENT
Start: 2023-03-07 | End: 2023-03-07

## 2023-03-07 RX ADMIN — LIDOCAINE HYDROCHLORIDE 5 ML: 20 JELLY TOPICAL at 11:03

## 2023-03-07 RX ADMIN — CIPROFLOXACIN 500 MG: 500 TABLET ORAL at 11:03

## 2023-03-08 NOTE — PROCEDURES
Complex urodynamics w/ cysto    Date/Time: 3/7/2023 10:00 AM  Performed by: Isabelle Wilkins MD  Authorized by: Isabelle Wilkins MD   Preparation: Patient was prepped and draped in the usual sterile fashion.    Sedation:  Patient sedated: no    Patient tolerance: patient tolerated the procedure well with no immediate complications    TITLE OF OPERATION:  Complex cystometry.  Electromyography with surface electrodes.  Pressure voiding flow study.  Abdominal pressure measurement.  Leak point pressure measurement.      SURGEONS NARRATIVE:  A time out was performed in which the patient identity and procedure were confirmed.  Urodynamic evaluation was performed using a computerized system ([employer]  Uroflowmetry was performed on the patient in the sitting position without catheters in place.  Subsequent urodynamic testing was performed with the patient in the lithotomy position at 45 degrees. Water charged catheters were used with sterile water as the infusion medium. Vesical and abdominal (rectal) pressures were measured, and detrusor pressure was calculated. EMG activity was recorded with surface electrodes. During filling, room temperature sterile water was infused at a rate of 30 cubic centimeters per minute. The patient was asked cough after instillation of each 100cc volume. Two Valsalva leak point pressures and two cough leak point pressures were performed with the catheters in place at 300 cubic centimeters and again at maximum capacity. Valsalva leak point pressure was defined as the difference between vesical pressure at which leakage was noted (visualized at the external urethral meatus) and the baseline vesical pressure. Following urodynamic testing, a pressure flow study was performed with the patient in the sitting position. Vesical and abdominal pressures were monitored and detrusor pressures were calculated. After the pressure flow study, the catheters were then removed. The patient  tolerated the procedure well.     Urine dipstick: negative.    1.  VOIDING PHASE:      a.  Uroflowmetry:  Not captured.      b.  Pressure flow:  Prolapse reduction: No  Voided volume:   335 mL  Voiding time:   7:16 mm:seconds  Peak flow:  9.6 mL/s   Avg flow:  2.3 mL/s  Max det pressure:  60 cm H20  Det pressure at max flow: inaccurate as catheters were displaced   Void initiated by  valsalva.    Urethral relaxation (EMG):  no.    PVR (calculated):  41 mL    The overall configuration of this pressure flow study was interrupted.      2.  FILLING PHASE:  1st sensation: 168 mL  First desire:  193 mL  Strong desire:  261 mL  Urgency:  377 mL  Compliance (calculated)  5.6 mL/cm H20  EMG activity during filling:   intermittently increased  Detrusor contractions observed: No.      3.  URETHRAL FUNCTION/STORAGE PHASE:    a.  WITHOUT prolapse reduction:  CLPP (150 mL): Negative at  55.5 cm H20  VLPP (150 mL): Negative  at  120 cm H20  CLPP (300 mL): Positive  at  77 cm H20  VLPP (300 mL): Positive  at  70 cm H20         These findings are consistent with Positive urodynamic stress incontinence at 300 mL.    Urodynamic testing Assessment:  UF  not performed.  PF  interrupted.  Compliance  normal.  Max capacity  normal at 377 mL.  DO (--).  JAX (+).        CYSTOSCOPY  Patient taken to procedure room and placed in dorsal lithotomy position. Perineum was prepped and draped using sterile technique.The urethra was prepped with betadine, and 10 mL of 2% lidocaine gel were instilled into the bladder through the urethra.   Next, cystourethroscopy was performed utilizing a flexible scope. The flexible cystourethroscope was introduced into the bladder under direct visualization. The bladder was distended with approximately 300 cubic centimeters of sterile water. A systematic survey was performed in which the bladder was surveyed using multiple sequential passes in a clockwise fashion from the bladder dome to the bladder base to the  urethrovesical junction. The trigone and ureteral orifices were observed. The scope was then flipped back on itself, and the urethrovesical junction was viewed. Upon completion of the bladder survey, the telescope was then completely withdrawn.      Indications: Urinary incontinence, nocturia    Findings:    Meatus: normal in appearance  Urethra: normal in appearance  Ureteral orifices: normal in appearance with clear efflux from the bilateral ureteral orifices  Bladder Diverticulum: two present, one on each bladder side wall  Bladder Stone(s): none  Bladder Tumor: none   Bladder Mucosa: normal in appearance  Bladder Wall: moderate trabeculations noted     Patient tolerated procedure well without complications. Antibiotic prophylaxis was given.      Plan:  83 yo with c/o nocturia, waking 4 times per night. She has done PT and tried Oxybutynin XL 5 mg and Myrbetriq 50 mg without significant changes in symptoms. Reports minimal daytime voiding and incontinence.  She does not endorse stress urinary incontinence with coughing, sneezing or laughing although there is urodynamic evidence of it. Reviewed with patient that she has significant lower extremity edema which is likely causing her nocturia. Advised that she try to wear support stockings during the day and elevate her legs above her heart if possible a couple of hours before bedtime. Also advised trying to take her diuretic in the late afternoon at about 3 pm to see if we can diurese the fluid before bedtime. Reviewed that nocturia is a challenging problem to treat and that there are limited options to medications. Daughter present for the discussion. They verbalized understanding and work on the recommended changes.  Reassured them both that there were no lesions or tumors noted on cystoscopy although there are moderate trabeculations which is c/w OAB and a couple of diverticuli which are not worrisome.   Patient will be scheduled for a follow-up visit in about  6-8 weeks at which time symptoms will be re-evaluated.       Isabelle Wilkins

## 2023-03-09 RX ORDER — LOSARTAN POTASSIUM 100 MG/1
100 TABLET ORAL DAILY
Qty: 90 TABLET | Refills: 1 | Status: SHIPPED | OUTPATIENT
Start: 2023-03-09 | End: 2023-06-08 | Stop reason: SDUPTHER

## 2023-03-09 RX ORDER — AMLODIPINE BESYLATE 10 MG/1
10 TABLET ORAL DAILY
Qty: 90 TABLET | Refills: 1 | Status: SHIPPED | OUTPATIENT
Start: 2023-03-09 | End: 2023-06-08 | Stop reason: SDUPTHER

## 2023-03-09 NOTE — TELEPHONE ENCOUNTER
"called pt twice in regards to medication refill but did not get an answer. Phone is giving the "busy" dial tone.  "

## 2023-03-10 NOTE — TELEPHONE ENCOUNTER
----- Message from Michelle Abdalla sent at 3/10/2023  3:58 PM CST -----  Contact: Eva silva/[facility] [phone]  Per Eva, their pharmacy as well as the pt has reached out in regards to refills for the pt. Pt is requesting refills for  metoprolol succinate (TOPROL-XL) 25 MG 24 hr tablet and allopurinoL (ZYLOPRIM) 300 MG tablet 90 tablet. Please call in regards to these medications as soon as possible.               Thank you

## 2023-03-13 RX ORDER — METOPROLOL SUCCINATE 25 MG/1
25 TABLET, EXTENDED RELEASE ORAL DAILY
Qty: 90 TABLET | Refills: 0 | Status: SHIPPED | OUTPATIENT
Start: 2023-03-13 | End: 2023-06-07 | Stop reason: SDUPTHER

## 2023-03-14 RX ORDER — MONTELUKAST SODIUM 10 MG/1
10 TABLET ORAL NIGHTLY
Qty: 90 TABLET | Refills: 0 | Status: SHIPPED | OUTPATIENT
Start: 2023-03-14 | End: 2023-09-12 | Stop reason: SDUPTHER

## 2023-03-14 RX ORDER — ALLOPURINOL 300 MG/1
300 TABLET ORAL DAILY
Qty: 90 TABLET | Refills: 0 | Status: SHIPPED | OUTPATIENT
Start: 2023-03-14 | End: 2023-06-08 | Stop reason: SDUPTHER

## 2023-03-14 RX ORDER — ALBUTEROL SULFATE 90 UG/1
2 AEROSOL, METERED RESPIRATORY (INHALATION) EVERY 6 HOURS PRN
Qty: 18 G | Refills: 11 | Status: SHIPPED | OUTPATIENT
Start: 2023-03-14 | End: 2023-06-08 | Stop reason: SDUPTHER

## 2023-03-14 RX ORDER — ATORVASTATIN CALCIUM 80 MG/1
80 TABLET, FILM COATED ORAL DAILY
Qty: 90 TABLET | Refills: 0 | Status: SHIPPED | OUTPATIENT
Start: 2023-03-14 | End: 2023-06-08 | Stop reason: SDUPTHER

## 2023-03-14 NOTE — TELEPHONE ENCOUNTER
----- Message from Rebekah Shrestha sent at 3/14/2023 11:34 AM CDT -----  Contact: [phone] Patient  Pt is requesting a call back from Dr Trevino's office about her medication. Pt states she wants to know why the doctor will not give her more than a 90 day supply. Pt states she needs to go over her medication list.

## 2023-04-18 ENCOUNTER — OFFICE VISIT (OUTPATIENT)
Dept: UROGYNECOLOGY | Facility: CLINIC | Age: 83
End: 2023-04-18
Payer: MEDICARE

## 2023-04-18 VITALS
DIASTOLIC BLOOD PRESSURE: 82 MMHG | BODY MASS INDEX: 35.56 KG/M2 | HEIGHT: 64 IN | SYSTOLIC BLOOD PRESSURE: 160 MMHG | WEIGHT: 208.31 LBS

## 2023-04-18 DIAGNOSIS — N39.41 URINARY INCONTINENCE, URGE: ICD-10-CM

## 2023-04-18 DIAGNOSIS — R35.1 NOCTURIA: Primary | ICD-10-CM

## 2023-04-18 PROCEDURE — 99213 PR OFFICE/OUTPT VISIT, EST, LEVL III, 20-29 MIN: ICD-10-PCS | Mod: HCNC,S$GLB,, | Performed by: OBSTETRICS & GYNECOLOGY

## 2023-04-18 PROCEDURE — 1159F MED LIST DOCD IN RCRD: CPT | Mod: HCNC,CPTII,S$GLB, | Performed by: OBSTETRICS & GYNECOLOGY

## 2023-04-18 PROCEDURE — 3079F PR MOST RECENT DIASTOLIC BLOOD PRESSURE 80-89 MM HG: ICD-10-PCS | Mod: HCNC,CPTII,S$GLB, | Performed by: OBSTETRICS & GYNECOLOGY

## 2023-04-18 PROCEDURE — 3079F DIAST BP 80-89 MM HG: CPT | Mod: HCNC,CPTII,S$GLB, | Performed by: OBSTETRICS & GYNECOLOGY

## 2023-04-18 PROCEDURE — 3077F SYST BP >= 140 MM HG: CPT | Mod: HCNC,CPTII,S$GLB, | Performed by: OBSTETRICS & GYNECOLOGY

## 2023-04-18 PROCEDURE — 1101F PR PT FALLS ASSESS DOC 0-1 FALLS W/OUT INJ PAST YR: ICD-10-PCS | Mod: HCNC,CPTII,S$GLB, | Performed by: OBSTETRICS & GYNECOLOGY

## 2023-04-18 PROCEDURE — 99999 PR PBB SHADOW E&M-EST. PATIENT-LVL IV: CPT | Mod: PBBFAC,HCNC,, | Performed by: OBSTETRICS & GYNECOLOGY

## 2023-04-18 PROCEDURE — 99999 PR PBB SHADOW E&M-EST. PATIENT-LVL IV: ICD-10-PCS | Mod: PBBFAC,HCNC,, | Performed by: OBSTETRICS & GYNECOLOGY

## 2023-04-18 PROCEDURE — 1160F PR REVIEW ALL MEDS BY PRESCRIBER/CLIN PHARMACIST DOCUMENTED: ICD-10-PCS | Mod: HCNC,CPTII,S$GLB, | Performed by: OBSTETRICS & GYNECOLOGY

## 2023-04-18 PROCEDURE — 1101F PT FALLS ASSESS-DOCD LE1/YR: CPT | Mod: HCNC,CPTII,S$GLB, | Performed by: OBSTETRICS & GYNECOLOGY

## 2023-04-18 PROCEDURE — 1159F PR MEDICATION LIST DOCUMENTED IN MEDICAL RECORD: ICD-10-PCS | Mod: HCNC,CPTII,S$GLB, | Performed by: OBSTETRICS & GYNECOLOGY

## 2023-04-18 PROCEDURE — 3288F PR FALLS RISK ASSESSMENT DOCUMENTED: ICD-10-PCS | Mod: HCNC,CPTII,S$GLB, | Performed by: OBSTETRICS & GYNECOLOGY

## 2023-04-18 PROCEDURE — 3288F FALL RISK ASSESSMENT DOCD: CPT | Mod: HCNC,CPTII,S$GLB, | Performed by: OBSTETRICS & GYNECOLOGY

## 2023-04-18 PROCEDURE — 1125F PR PAIN SEVERITY QUANTIFIED, PAIN PRESENT: ICD-10-PCS | Mod: HCNC,CPTII,S$GLB, | Performed by: OBSTETRICS & GYNECOLOGY

## 2023-04-18 PROCEDURE — 99213 OFFICE O/P EST LOW 20 MIN: CPT | Mod: HCNC,S$GLB,, | Performed by: OBSTETRICS & GYNECOLOGY

## 2023-04-18 PROCEDURE — 3077F PR MOST RECENT SYSTOLIC BLOOD PRESSURE >= 140 MM HG: ICD-10-PCS | Mod: HCNC,CPTII,S$GLB, | Performed by: OBSTETRICS & GYNECOLOGY

## 2023-04-18 PROCEDURE — 1125F AMNT PAIN NOTED PAIN PRSNT: CPT | Mod: HCNC,CPTII,S$GLB, | Performed by: OBSTETRICS & GYNECOLOGY

## 2023-04-18 PROCEDURE — 1160F RVW MEDS BY RX/DR IN RCRD: CPT | Mod: HCNC,CPTII,S$GLB, | Performed by: OBSTETRICS & GYNECOLOGY

## 2023-04-18 RX ORDER — TROSPIUM CHLORIDE ER 60 MG/1
60 CAPSULE ORAL DAILY
Qty: 30 CAPSULE | Refills: 11 | Status: SHIPPED | OUTPATIENT
Start: 2023-04-18 | End: 2023-09-27 | Stop reason: SDUPTHER

## 2023-04-18 NOTE — LETTER
April 18, 2023      [facility]  [address] [address]  Phone: [phone]       Patient: Brittnee Gupta   YOB: 1940  Date of Visit: 04/18/2023    To Whom It May Concern:    Brittnee Gupta  was at [facility] on 04/18/2023 accompanied by her daughter Ms. Whitney. The patient may return to work/school on 4/19/23 with no restrictions. If you have any questions or concerns, or if I can be of further assistance, please do not hesitate to contact me.    Sincerely,    Isabelle Wilkins MD

## 2023-04-19 ENCOUNTER — DOCUMENTATION ONLY (OUTPATIENT)
Dept: UROLOGY | Facility: CLINIC | Age: 83
End: 2023-04-19
Payer: MEDICARE

## 2023-04-19 ENCOUNTER — PATIENT OUTREACH (OUTPATIENT)
Dept: ADMINISTRATIVE | Facility: HOSPITAL | Age: 83
End: 2023-04-19
Payer: MEDICARE

## 2023-04-19 NOTE — PROGRESS NOTES
Patient due for the following    TETANUS VACCINE     Pneumococcal Vaccines (Age 65+) (1 - PCV)    COVID-19 Vaccine (4 - Booster for Pfizer series)      Received mammogram records.  Scanned to media.  updated    Immunizations: reviewed and updated  Care Everywhere: triggered  Care Teams: up to date  Outreach: none needed

## 2023-04-19 NOTE — PROGRESS NOTES
[facility]      4/19/2023    ATTN: Prior Authorizations / Appeals  Re: Coverage of Sanctura XR    Brittnee Gupta  1940    Relevant Diagnosis: Overactive Bladder    To whom it may concern:    I am submitting this letter to document the medical necessity of Sanctura XR 60 mg for the treatment of Brittnee Gupta.  Sanctura XR is indicated for the treatment of Overactive Bladder. She has been diagnosed with Overactive Bladder and has been receiving treatment for this diagnosis. She has already been tried on alternative formulary therapies including pelvic floor physical therapy, oxybutynin 5mg and Myrbetriq 50 mg. For each formulary agent, treatment with these alternative medications resulted in intolerable side effects requiring discontinuation including dizziness or provided no significant improvement despite treatment at appropriate dosage and adequate duration of therapy.     Based on the information provided above, I have determined that treatment with Sanctura XR is medically necessary and reasonable for Brittnee Gupta.    Enclosed are copies of her medical records documenting related symptoms and medical necessity. Please approve coverage for this product as recommended.  Thank you for your prompt attention to this matter.      Kindest regards,    Isabelle Wilkins MD  [facility] [place], [place]

## 2023-05-22 ENCOUNTER — NURSE TRIAGE (OUTPATIENT)
Dept: ADMINISTRATIVE | Facility: CLINIC | Age: 83
End: 2023-05-22
Payer: MEDICARE

## 2023-05-22 ENCOUNTER — TELEPHONE (OUTPATIENT)
Dept: PRIMARY CARE CLINIC | Facility: CLINIC | Age: 83
End: 2023-05-22
Payer: MEDICARE

## 2023-05-22 NOTE — TELEPHONE ENCOUNTER
Spoke with Pt took Medication at 8:00am omeprazole (PRILOSEC) 20 MG capsule ,amLODIPine (NORVASC) 10 MG tablet,and losartan (COZAAR) 100     Pt then took it medications again at 2:00pm omeprazole (PRILOSEC) 20 MG capsule ,amLODIPine (NORVASC) 10 MG tablet,and losartan (COZAAR) 100      Pt denied Any Weakness , dizzy or Nausea at this time    Spoke With Pt Advised her to call our On call Nurse  Pt said B/p 154/78  Told Pt if she start feeling weak, dizzy or nausea to go to ER  Pt verbally understand

## 2023-05-22 NOTE — TELEPHONE ENCOUNTER
----- Message from Renetta Bryson sent at 5/22/2023  2:27 PM CDT -----  Contact: [phone] Patient  Pt states she took her medication twice today and want to know if that's ok or should she be concerned about it . omeprazole (PRILOSEC) 20 MG capsule ,amLODIPine (NORVASC) 10 MG tablet,and losartan (COZAAR) 100 MG tablet. Please call and advise. Thanks

## 2023-05-22 NOTE — TELEPHONE ENCOUNTER
Spoke with the pt, she advised that currently feeling fine. Denies symptoms and noted her BP is currently high at 164/86.     Patient advised to monitor BP closely. Advised go to ED if she becomes weak, dizzy/lightheaded, has severe fatigue, develops nausea/vomiting, or see that her BP is dropping below 90/60. Advised that if she has no one to take her call 911 for EMS.     Advised pt to call back tomorrow morning with BP readings.     Please advise with further recommendations.

## 2023-05-22 NOTE — TELEPHONE ENCOUNTER
Pt reports that she took her blood pressure medication at 8am and at 2pm on accident. Denies any symptoms and currently her b/p is 164/86. Per protocol advised to transfer to PCP office now. verbalized understanding. Reached out to Marylu Miguel LPN via secure chat and was able to transfer pt to speak with her.     losartan (COZAAR) 100 MG tablet 90 tablet 1 3/9/2023  No   Sig - Route: Take 1 tablet (100 mg total) by mouth once daily. - Oral   Sent to pharmacy as: losartan (COZAAR) 100 MG tablet   Class: Normal     amLODIPine (NORVASC) 10 MG tablet 90 tablet 1 3/9/2023  No   Sig - Route: Take 1 tablet (10 mg total) by mouth once daily. - Oral   Sent to pharmacy as: amLODIPine (NORVASC) 10 MG tablet   Class: Normal       Reason for Disposition   DOUBLE DOSE (an extra dose or lesser amount) of prescription drug and NO symptoms  (Exception: A double dose of antibiotics.)    Additional Information   Negative: MORE THAN A DOUBLE DOSE of a prescription or over-the-counter (OTC) drug   Negative: DOUBLE DOSE (an extra dose or lesser amount) of prescription drug and any symptoms (e.g., dizziness, nausea, pain, sleepiness)   Negative: DOUBLE DOSE (an extra dose or lesser amount) of over-the-counter (OTC) drug and any symptoms (e.g., dizziness, nausea, pain, sleepiness)   Negative: Took another person's prescription drug    Protocols used: Medication Question Call-A-OH

## 2023-06-07 RX ORDER — METOPROLOL SUCCINATE 25 MG/1
25 TABLET, EXTENDED RELEASE ORAL DAILY
Qty: 90 TABLET | Refills: 1 | Status: SHIPPED | OUTPATIENT
Start: 2023-06-07 | End: 2023-06-08

## 2023-06-07 NOTE — TELEPHONE ENCOUNTER
----- Message from Libby Abdalla sent at 6/7/2023  9:19 AM CDT -----  Contact: Evelio/  Requesting an RX refill or new RX.  Is this a refill or new RX: refill  RX name and strength :  metoprolol succinate (TOPROL-XL) 25 MG 24 hr tablet 90 tablet   Is this a 30 day or 90 day RX: 90  Pharmacy name and phone # :  [facility] [address]   Phone:  [phone]  Fax:  [phone]         Patient is out of medication

## 2023-06-08 ENCOUNTER — TELEPHONE (OUTPATIENT)
Dept: PRIMARY CARE CLINIC | Facility: CLINIC | Age: 83
End: 2023-06-08
Payer: MEDICARE

## 2023-06-08 ENCOUNTER — OFFICE VISIT (OUTPATIENT)
Dept: PRIMARY CARE CLINIC | Facility: CLINIC | Age: 83
End: 2023-06-08
Payer: MEDICARE

## 2023-06-08 VITALS
HEART RATE: 62 BPM | OXYGEN SATURATION: 99 % | DIASTOLIC BLOOD PRESSURE: 68 MMHG | HEIGHT: 64 IN | RESPIRATION RATE: 17 BRPM | WEIGHT: 205 LBS | BODY MASS INDEX: 35 KG/M2 | TEMPERATURE: 98 F | SYSTOLIC BLOOD PRESSURE: 140 MMHG

## 2023-06-08 DIAGNOSIS — N39.46 MIXED STRESS AND URGE URINARY INCONTINENCE: ICD-10-CM

## 2023-06-08 DIAGNOSIS — M1A.9XX0 CHRONIC GOUT INVOLVING TOE WITHOUT TOPHUS, UNSPECIFIED CAUSE, UNSPECIFIED LATERALITY: ICD-10-CM

## 2023-06-08 DIAGNOSIS — I50.32 CHRONIC DIASTOLIC CONGESTIVE HEART FAILURE, NYHA CLASS 1: ICD-10-CM

## 2023-06-08 DIAGNOSIS — M19.90 ARTHRITIS: ICD-10-CM

## 2023-06-08 DIAGNOSIS — R73.03 PREDIABETES: ICD-10-CM

## 2023-06-08 DIAGNOSIS — J44.9 CHRONIC OBSTRUCTIVE PULMONARY DISEASE, UNSPECIFIED COPD TYPE: ICD-10-CM

## 2023-06-08 DIAGNOSIS — R35.1 NOCTURIA: ICD-10-CM

## 2023-06-08 DIAGNOSIS — E66.01 MORBID (SEVERE) OBESITY DUE TO EXCESS CALORIES: Primary | ICD-10-CM

## 2023-06-08 DIAGNOSIS — E78.5 HYPERLIPIDEMIA, UNSPECIFIED HYPERLIPIDEMIA TYPE: ICD-10-CM

## 2023-06-08 DIAGNOSIS — Z00.00 ROUTINE GENERAL MEDICAL EXAMINATION AT A HEALTH CARE FACILITY: Primary | ICD-10-CM

## 2023-06-08 DIAGNOSIS — I10 BENIGN ESSENTIAL HTN: ICD-10-CM

## 2023-06-08 PROCEDURE — 3077F PR MOST RECENT SYSTOLIC BLOOD PRESSURE >= 140 MM HG: ICD-10-PCS | Mod: CPTII,S$GLB,, | Performed by: INTERNAL MEDICINE

## 2023-06-08 PROCEDURE — 1125F AMNT PAIN NOTED PAIN PRSNT: CPT | Mod: CPTII,S$GLB,, | Performed by: INTERNAL MEDICINE

## 2023-06-08 PROCEDURE — 1159F MED LIST DOCD IN RCRD: CPT | Mod: CPTII,S$GLB,, | Performed by: INTERNAL MEDICINE

## 2023-06-08 PROCEDURE — 99999 PR PBB SHADOW E&M-EST. PATIENT-LVL V: ICD-10-PCS | Mod: PBBFAC,,, | Performed by: INTERNAL MEDICINE

## 2023-06-08 PROCEDURE — 99214 PR OFFICE/OUTPT VISIT, EST, LEVL IV, 30-39 MIN: ICD-10-PCS | Mod: S$GLB,,, | Performed by: INTERNAL MEDICINE

## 2023-06-08 PROCEDURE — 1160F PR REVIEW ALL MEDS BY PRESCRIBER/CLIN PHARMACIST DOCUMENTED: ICD-10-PCS | Mod: CPTII,S$GLB,, | Performed by: INTERNAL MEDICINE

## 2023-06-08 PROCEDURE — 3078F DIAST BP <80 MM HG: CPT | Mod: CPTII,S$GLB,, | Performed by: INTERNAL MEDICINE

## 2023-06-08 PROCEDURE — 99999 PR PBB SHADOW E&M-EST. PATIENT-LVL V: CPT | Mod: PBBFAC,,, | Performed by: INTERNAL MEDICINE

## 2023-06-08 PROCEDURE — 3288F PR FALLS RISK ASSESSMENT DOCUMENTED: ICD-10-PCS | Mod: CPTII,S$GLB,, | Performed by: INTERNAL MEDICINE

## 2023-06-08 PROCEDURE — 3078F PR MOST RECENT DIASTOLIC BLOOD PRESSURE < 80 MM HG: ICD-10-PCS | Mod: CPTII,S$GLB,, | Performed by: INTERNAL MEDICINE

## 2023-06-08 PROCEDURE — 99214 OFFICE O/P EST MOD 30 MIN: CPT | Mod: S$GLB,,, | Performed by: INTERNAL MEDICINE

## 2023-06-08 PROCEDURE — 1160F RVW MEDS BY RX/DR IN RCRD: CPT | Mod: CPTII,S$GLB,, | Performed by: INTERNAL MEDICINE

## 2023-06-08 PROCEDURE — 1125F PR PAIN SEVERITY QUANTIFIED, PAIN PRESENT: ICD-10-PCS | Mod: CPTII,S$GLB,, | Performed by: INTERNAL MEDICINE

## 2023-06-08 PROCEDURE — 1101F PR PT FALLS ASSESS DOC 0-1 FALLS W/OUT INJ PAST YR: ICD-10-PCS | Mod: CPTII,S$GLB,, | Performed by: INTERNAL MEDICINE

## 2023-06-08 PROCEDURE — 1101F PT FALLS ASSESS-DOCD LE1/YR: CPT | Mod: CPTII,S$GLB,, | Performed by: INTERNAL MEDICINE

## 2023-06-08 PROCEDURE — 3077F SYST BP >= 140 MM HG: CPT | Mod: CPTII,S$GLB,, | Performed by: INTERNAL MEDICINE

## 2023-06-08 PROCEDURE — 1159F PR MEDICATION LIST DOCUMENTED IN MEDICAL RECORD: ICD-10-PCS | Mod: CPTII,S$GLB,, | Performed by: INTERNAL MEDICINE

## 2023-06-08 PROCEDURE — 3288F FALL RISK ASSESSMENT DOCD: CPT | Mod: CPTII,S$GLB,, | Performed by: INTERNAL MEDICINE

## 2023-06-08 RX ORDER — ALLOPURINOL 300 MG/1
300 TABLET ORAL DAILY
Qty: 90 TABLET | Refills: 3 | Status: SHIPPED | OUTPATIENT
Start: 2023-06-08

## 2023-06-08 RX ORDER — FUROSEMIDE 20 MG/1
20 TABLET ORAL DAILY
Qty: 90 TABLET | Refills: 3 | Status: SHIPPED | OUTPATIENT
Start: 2023-06-08 | End: 2024-06-07

## 2023-06-08 RX ORDER — ALBUTEROL SULFATE 90 UG/1
2 AEROSOL, METERED RESPIRATORY (INHALATION) EVERY 6 HOURS PRN
Qty: 18 G | Refills: 3 | Status: SHIPPED | OUTPATIENT
Start: 2023-06-08

## 2023-06-08 RX ORDER — GABAPENTIN 100 MG/1
100 CAPSULE ORAL 3 TIMES DAILY
Qty: 270 CAPSULE | Refills: 3 | Status: SHIPPED | OUTPATIENT
Start: 2023-06-08 | End: 2024-06-02

## 2023-06-08 RX ORDER — LOSARTAN POTASSIUM 100 MG/1
100 TABLET ORAL DAILY
Qty: 90 TABLET | Refills: 3 | Status: SHIPPED | OUTPATIENT
Start: 2023-06-08 | End: 2023-09-27

## 2023-06-08 RX ORDER — ATORVASTATIN CALCIUM 80 MG/1
80 TABLET, FILM COATED ORAL DAILY
Qty: 90 TABLET | Refills: 3 | Status: SHIPPED | OUTPATIENT
Start: 2023-06-08 | End: 2024-06-02

## 2023-06-08 RX ORDER — METOPROLOL SUCCINATE 50 MG/1
50 TABLET, EXTENDED RELEASE ORAL DAILY
Qty: 90 TABLET | Refills: 3 | Status: SHIPPED | OUTPATIENT
Start: 2023-06-08 | End: 2023-09-27

## 2023-06-08 RX ORDER — AMLODIPINE BESYLATE 10 MG/1
10 TABLET ORAL DAILY
Qty: 90 TABLET | Refills: 3 | Status: SHIPPED | OUTPATIENT
Start: 2023-06-08

## 2023-06-08 NOTE — PROGRESS NOTES
Subjective:      Patient ID: Brittnee Gupta is a 82 y.o. female.    Chief Complaint: Follow-up      Brittnee Gupta is a 82 y.o. female with PMH significant for HTN, chronic gout, HLD, asthma, dCHF, frequent urination.  Presenting today for follow up.     dCHF (HFpEF): Note mild bilateral leg swelling, worsening. Does have SOB at times, but thinks this could be 2/2 to asthma as well. Change lasix to qday.  Echo ordered this visit.    Frequent urination: She was seen by Dr. Nicholson with urogyn. She does not tolerate the oxybutynin so this was discontinued and was started on myrbetriq but has not been noncompliant. She was recommended behavioral therapy to help with her frequent urination. Continue to follow with urogyn.     HTN: Office BP is 140/68 . Increase metoprolol at today's visit. No CP/SOB/lightheadedness/dizziness/palpitations.      Knee/back pain: Reports chronic knee and back pain. Does not want pain medication. Will consider naproxen at this time.     Denies any chest pain, shortness of breath, nausea vomiting constipation diarrhea, blood in stool, heartburn    Review of Systems   Constitutional:  Negative for chills, fever and weight loss.   HENT:  Negative for congestion, ear pain and sore throat.    Eyes:  Negative for double vision.   Respiratory:  Negative for cough and shortness of breath.    Cardiovascular:  Negative for chest pain, palpitations and leg swelling.   Gastrointestinal:  Negative for abdominal pain, heartburn, nausea and vomiting.   Skin:  Negative for rash.   Neurological:  Negative for dizziness, tingling and headaches.   Psychiatric/Behavioral:  Negative for depression.         Current Outpatient Medications:     aspirin (ECOTRIN) 81 MG EC tablet, Take by mouth once daily., Disp: , Rfl:     azelastine (ASTELIN) 137 mcg (0.1 %) nasal spray, 1 spray by Nasal route 2 (two) times daily., Disp: , Rfl:     calcium carbonate (OS-SURJIT) 500 mg calcium (1,250 mg) tablet, Take 1 tablet by mouth  once daily., Disp: , Rfl:     diphenhydrAMINE (BENADRYL) 25 mg capsule, Take 25 mg by mouth every 6 (six) hours as needed for Itching., Disp: , Rfl:     docusate sodium (COLACE) 100 MG capsule, Take 100 mg by mouth 2 (two) times daily., Disp: , Rfl:     fluticasone propion-salmeterol 45-21 mcg/dose (ADVAIR HFA) 45-21 mcg/actuation HFAA inhaler, Inhale 2 puffs into the lungs every 12 (twelve) hours. Controller, Disp: 12 g, Rfl: 4    loratadine (CLARITIN) 10 mg tablet, Take 10 mg by mouth once daily., Disp: , Rfl:     mirabegron (MYRBETRIQ) 50 mg Tb24, Take 1 tablet (50 mg total) by mouth every evening., Disp: 90 tablet, Rfl: 1    montelukast (SINGULAIR) 10 mg tablet, Take 1 tablet (10 mg total) by mouth every evening., Disp: 90 tablet, Rfl: 0    naproxen (NAPROSYN) 500 MG tablet, Take 1 tablet (500 mg total) by mouth 2 (two) times daily with meals., Disp: 90 tablet, Rfl: 1    omeprazole (PRILOSEC) 20 MG capsule, Take 1 capsule (20 mg total) by mouth once daily., Disp: 90 capsule, Rfl: 1    trospium (SANCTURA XR) 60 mg Cp24 capsule, Take 1 capsule (60 mg total) by mouth once daily., Disp: 30 capsule, Rfl: 11    albuterol (PROVENTIL/VENTOLIN HFA) 90 mcg/actuation inhaler, Inhale 2 puffs into the lungs every 6 (six) hours as needed for Wheezing. Rescue, Disp: 18 g, Rfl: 3    allopurinoL (ZYLOPRIM) 300 MG tablet, Take 1 tablet (300 mg total) by mouth once daily., Disp: 90 tablet, Rfl: 3    amLODIPine (NORVASC) 10 MG tablet, Take 1 tablet (10 mg total) by mouth once daily., Disp: 90 tablet, Rfl: 3    atorvastatin (LIPITOR) 80 MG tablet, Take 1 tablet (80 mg total) by mouth once daily., Disp: 90 tablet, Rfl: 3    furosemide (LASIX) 20 MG tablet, Take 1 tablet (20 mg total) by mouth once daily., Disp: 90 tablet, Rfl: 3    gabapentin (NEURONTIN) 100 MG capsule, Take 1 capsule (100 mg total) by mouth 3 (three) times daily., Disp: 270 capsule, Rfl: 3    losartan (COZAAR) 100 MG tablet, Take 1 tablet (100 mg total) by mouth  "once daily., Disp: 90 tablet, Rfl: 3    metoprolol succinate (TOPROL-XL) 50 MG 24 hr tablet, Take 1 tablet (50 mg total) by mouth once daily., Disp: 90 tablet, Rfl: 3    Lab Results   Component Value Date    HGBA1C 5.7 (H) 08/10/2022     No results found for: MICALBCREAT  Lab Results   Component Value Date    LDLCALC 50.8 (L) 08/10/2022    CHOL 118 (L) 08/10/2022    HDL 54 08/10/2022    TRIG 66 08/10/2022       Lab Results   Component Value Date     08/10/2022    K 3.8 08/10/2022     08/10/2022    CO2 26 08/10/2022     08/10/2022    BUN 13 08/10/2022    CREATININE 0.8 08/10/2022    CALCIUM 9.9 08/10/2022    PROT 7.9 08/10/2022    ALBUMIN 4.0 08/10/2022    BILITOT 1.3 (H) 08/10/2022    ALKPHOS 127 08/10/2022    AST 20 08/10/2022    ALT 16 08/10/2022    ANIONGAP 12 08/10/2022    WBC 5.23 09/07/2022    HGB 14.5 09/07/2022    HCT 45.4 09/07/2022    MCV 97 09/07/2022     09/07/2022    TSH 1.074 08/10/2022       No results found for: LH, FSH, TOTALTESTOST, PROGESTERONE, ESTRADIOL, IXWEBCAZ58TC, RVYZSZTB13, FERRITIN, IRON, TRANSFERRIN, TIBC, FESATURATED, ZINC      Past Medical History:   Diagnosis Date    Allergy     Asthma     GERD (gastroesophageal reflux disease)     Hypertension      Past Surgical History:   Procedure Laterality Date    HYSTERECTOMY      MYOMECTOMY       Social History     Social History Narrative    Not on file     History reviewed. No pertinent family history.  Vitals:    06/08/23 1134 06/08/23 1153   BP: (!) 142/72 (!) 140/68   Pulse: 62    Resp: 17    Temp: 98 °F (36.7 °C)    SpO2: 99%    Weight: 93 kg (205 lb 0.4 oz)    Height: 5' 4" (1.626 m)    PainSc:   8      Objective:   Physical Exam  Vitals reviewed.   Constitutional:       Appearance: Normal appearance.   HENT:      Head: Normocephalic.      Right Ear: Tympanic membrane, ear canal and external ear normal.      Left Ear: Tympanic membrane, ear canal and external ear normal.      Nose: Nose normal.      " Mouth/Throat:      Mouth: Mucous membranes are moist.      Pharynx: Oropharynx is clear.   Eyes:      Conjunctiva/sclera: Conjunctivae normal.      Pupils: Pupils are equal, round, and reactive to light.   Cardiovascular:      Rate and Rhythm: Normal rate and regular rhythm.      Pulses: Normal pulses.   Pulmonary:      Effort: Pulmonary effort is normal.      Breath sounds: Normal breath sounds.   Abdominal:      General: Abdomen is flat. Bowel sounds are normal.      Palpations: Abdomen is soft.   Musculoskeletal:      Cervical back: Neck supple.   Skin:     General: Skin is warm.   Neurological:      General: No focal deficit present.      Mental Status: She is alert.   Psychiatric:         Mood and Affect: Mood normal.     Assessment/Plan     Brittnee Gupta is a 82 y.o.female with:    Morbid (severe) obesity due to excess calories    Chronic obstructive pulmonary disease, unspecified COPD type  -     albuterol (PROVENTIL/VENTOLIN HFA) 90 mcg/actuation inhaler; Inhale 2 puffs into the lungs every 6 (six) hours as needed for Wheezing. Rescue  Dispense: 18 g; Refill: 3    Chronic diastolic congestive heart failure, NYHA class 1  -     furosemide (LASIX) 20 MG tablet; Take 1 tablet (20 mg total) by mouth once daily.  Dispense: 90 tablet; Refill: 3  -     Echo; Future    Benign essential HTN  -     metoprolol succinate (TOPROL-XL) 50 MG 24 hr tablet; Take 1 tablet (50 mg total) by mouth once daily.  Dispense: 90 tablet; Refill: 3  -     amLODIPine (NORVASC) 10 MG tablet; Take 1 tablet (10 mg total) by mouth once daily.  Dispense: 90 tablet; Refill: 3  -     losartan (COZAAR) 100 MG tablet; Take 1 tablet (100 mg total) by mouth once daily.  Dispense: 90 tablet; Refill: 3  -     Cancel: CBC Auto Differential; Future; Expected date: 06/08/2023  -     Cancel: Comprehensive Metabolic Panel; Future; Expected date: 06/08/2023  -     Cancel: TSH; Future; Expected date: 06/08/2023  -     Cancel: Lipid Panel; Future; Expected  date: 06/08/2023  -     CBC Auto Differential; Future; Expected date: 06/08/2023  -     Comprehensive Metabolic Panel; Future; Expected date: 06/08/2023  -     TSH; Future; Expected date: 06/08/2023    Nocturia    Mixed stress and urge urinary incontinence    Chronic gout involving toe without tophus, unspecified cause, unspecified laterality  -     allopurinoL (ZYLOPRIM) 300 MG tablet; Take 1 tablet (300 mg total) by mouth once daily.  Dispense: 90 tablet; Refill: 3    Hyperlipidemia, unspecified hyperlipidemia type  -     atorvastatin (LIPITOR) 80 MG tablet; Take 1 tablet (80 mg total) by mouth once daily.  Dispense: 90 tablet; Refill: 3  -     Cancel: Lipid Panel; Future; Expected date: 06/08/2023  -     Lipid Panel; Future; Expected date: 06/08/2023    Prediabetes  -     Cancel: Hemoglobin A1C; Future; Expected date: 06/08/2023  -     Cancel: Comprehensive Metabolic Panel; Future; Expected date: 06/08/2023  -     Cancel: Lipid Panel; Future; Expected date: 06/08/2023  -     Hemoglobin A1C; Future; Expected date: 06/08/2023  -     Comprehensive Metabolic Panel; Future; Expected date: 06/08/2023  -     TSH; Future; Expected date: 06/08/2023  -     Lipid Panel; Future; Expected date: 06/08/2023    Arthritis  -     gabapentin (NEURONTIN) 100 MG capsule; Take 1 capsule (100 mg total) by mouth 3 (three) times daily.  Dispense: 270 capsule; Refill: 3         Chronic conditions status updated as per HPI.  Other than changes above, cont current medications and maintain follow up with specialists.  Return to clinic in Follow up in about 6 months (around 12/8/2023).      Diane Trevino MD  [facility]    There are no Patient Instructions on file for this visit.  All of your core healthy metrics are met.

## 2023-06-08 NOTE — TELEPHONE ENCOUNTER
----- Message from Marylu Dominguez sent at 6/8/2023 12:24 PM CDT -----  Regarding: canceled labs  Good afternoon,    Patient was a hard stick and wants to go to another location. Orders have been canceled. Please reorder.    Thank You

## 2023-07-13 ENCOUNTER — HOSPITAL ENCOUNTER (OUTPATIENT)
Dept: CARDIOLOGY | Facility: OTHER | Age: 83
Discharge: HOME | End: 2023-07-13
Attending: INTERNAL MEDICINE
Payer: MEDICARE

## 2023-07-13 VITALS
DIASTOLIC BLOOD PRESSURE: 68 MMHG | WEIGHT: 205 LBS | HEIGHT: 64 IN | BODY MASS INDEX: 35 KG/M2 | SYSTOLIC BLOOD PRESSURE: 140 MMHG | HEART RATE: 62 BPM

## 2023-07-13 DIAGNOSIS — I50.32 CHRONIC DIASTOLIC CONGESTIVE HEART FAILURE, NYHA CLASS 1: ICD-10-CM

## 2023-07-13 LAB
ASCENDING AORTA: 2.79 CM
AV INDEX (PROSTH): 0.77
AV MEAN GRADIENT: 10 MMHG
AV PEAK GRADIENT: 17 MMHG
AV REGURGITATION PRESSURE HALF TIME: 409 MS
AV VALVE AREA: 2.18 CM2
AV VELOCITY RATIO: 0.68
BSA FOR ECHO PROCEDURE: 2.05 M2
CV ECHO LV RWT: 0.45 CM
DOP CALC AO PEAK VEL: 2.08 M/S
DOP CALC AO VTI: 49 CM
DOP CALC LVOT AREA: 2.8 CM2
DOP CALC LVOT DIAMETER: 1.9 CM
DOP CALC LVOT PEAK VEL: 1.42 M/S
DOP CALC LVOT STROKE VOLUME: 106.84 CM3
DOP CALC RVOT PEAK VEL: 0.84 M/S
DOP CALC RVOT VTI: 19.1 CM
DOP CALCLVOT PEAK VEL VTI: 37.7 CM
E WAVE DECELERATION TIME: 243.22 MSEC
E/A RATIO: 0.76
E/E' RATIO: 9.47 M/S
ECHO LV POSTERIOR WALL: 0.98 CM (ref 0.6–1.1)
EJECTION FRACTION: 66 %
FRACTIONAL SHORTENING: 37 % (ref 28–44)
INTERVENTRICULAR SEPTUM: 0.9 CM (ref 0.6–1.1)
IVC DIAMETER: 1.2 CM
IVRT: 121.79 MSEC
LA MAJOR: 6.06 CM
LA MINOR: 5.99 CM
LA WIDTH: 4.6 CM
LEFT ATRIUM SIZE: 3.66 CM
LEFT ATRIUM VOLUME INDEX MOD: 40.4 ML/M2
LEFT ATRIUM VOLUME INDEX: 43.5 ML/M2
LEFT ATRIUM VOLUME MOD: 80 CM3
LEFT ATRIUM VOLUME: 86.22 CM3
LEFT INTERNAL DIMENSION IN SYSTOLE: 2.78 CM (ref 2.1–4)
LEFT VENTRICLE DIASTOLIC VOLUME INDEX: 43.71 ML/M2
LEFT VENTRICLE DIASTOLIC VOLUME: 86.55 ML
LEFT VENTRICLE MASS INDEX: 68 G/M2
LEFT VENTRICLE SYSTOLIC VOLUME INDEX: 14.7 ML/M2
LEFT VENTRICLE SYSTOLIC VOLUME: 29.16 ML
LEFT VENTRICULAR INTERNAL DIMENSION IN DIASTOLE: 4.38 CM (ref 3.5–6)
LEFT VENTRICULAR MASS: 134.79 G
LV LATERAL E/E' RATIO: 7.1 M/S
LV SEPTAL E/E' RATIO: 14.2 M/S
LVOT MG: 4.79 MMHG
LVOT MV: 1.05 CM/S
MV PEAK A VEL: 0.93 M/S
MV PEAK E VEL: 0.71 M/S
MV STENOSIS PRESSURE HALF TIME: 70.53 MS
MV VALVE AREA P 1/2 METHOD: 3.12 CM2
PISA AR MAX VEL: 1.93 M/S
PISA MRMAX VEL: 5.52 M/S
PISA TR MAX VEL: 3.27 M/S
PULM VEIN S/D RATIO: 1.72
PV MEAN GRADIENT: 1.63 MMHG
PV PEAK D VEL: 0.25 M/S
PV PEAK S VEL: 0.43 M/S
PV PEAK VELOCITY: 0.95 CM/S
RA MAJOR: 5.01 CM
RA PRESSURE: 3 MMHG
RA WIDTH: 3.8 CM
RIGHT VENTRICULAR END-DIASTOLIC DIMENSION: 3.4 CM
RV TISSUE DOPPLER FREE WALL SYSTOLIC VELOCITY 1 (APICAL 4 CHAMBER VIEW): 9 CM/S
SINUS: 2.7 CM
STJ: 2.55 CM
TDI LATERAL: 0.1 M/S
TDI SEPTAL: 0.05 M/S
TDI: 0.08 M/S
TR MAX PG: 43 MMHG
TRICUSPID ANNULAR PLANE SYSTOLIC EXCURSION: 1.9 CM
TV REST PULMONARY ARTERY PRESSURE: 46 MMHG

## 2023-07-13 PROCEDURE — 93306 ECHO (CUPID ONLY): ICD-10-PCS | Mod: 26,HCNC,, | Performed by: INTERNAL MEDICINE

## 2023-07-13 PROCEDURE — 93306 TTE W/DOPPLER COMPLETE: CPT | Mod: 26,HCNC,, | Performed by: INTERNAL MEDICINE

## 2023-07-13 PROCEDURE — 93306 TTE W/DOPPLER COMPLETE: CPT | Mod: HCNC

## 2023-07-17 NOTE — PROGRESS NOTES
Echo looks good. Good pumping function, mildly decrease relaxation function. No changes in management at this time.

## 2023-07-22 LAB
ALBUMIN SERPL-MCNC: 4 G/DL (ref 3.6–5.1)
ALBUMIN/GLOB SERPL: 1.4 (CALC) (ref 1–2.5)
ALP SERPL-CCNC: 93 U/L (ref 37–153)
ALT SERPL-CCNC: 11 U/L (ref 6–29)
AST SERPL-CCNC: 18 U/L (ref 10–35)
BASOPHILS # BLD AUTO: 19 CELLS/UL (ref 0–200)
BASOPHILS NFR BLD AUTO: 0.4 %
BILIRUB SERPL-MCNC: 1.5 MG/DL (ref 0.2–1.2)
BUN SERPL-MCNC: 16 MG/DL (ref 7–25)
BUN/CREAT SERPL: ABNORMAL (CALC) (ref 6–22)
CALCIUM SERPL-MCNC: 9.4 MG/DL (ref 8.6–10.4)
CHLORIDE SERPL-SCNC: 102 MMOL/L (ref 98–110)
CHOLEST SERPL-MCNC: 119 MG/DL
CHOLEST/HDLC SERPL: 2.3 (CALC)
CO2 SERPL-SCNC: 29 MMOL/L (ref 20–32)
CREAT SERPL-MCNC: 0.82 MG/DL (ref 0.6–0.95)
EGFR: 71 ML/MIN/1.73M2
EOSINOPHIL # BLD AUTO: 0 CELLS/UL (ref 15–500)
EOSINOPHIL NFR BLD AUTO: 0 %
ERYTHROCYTE [DISTWIDTH] IN BLOOD BY AUTOMATED COUNT: 13 % (ref 11–15)
GLOBULIN SER CALC-MCNC: 2.9 G/DL (CALC) (ref 1.9–3.7)
GLUCOSE SERPL-MCNC: 100 MG/DL (ref 65–99)
HBA1C MFR BLD: 5.4 % OF TOTAL HGB
HCT VFR BLD AUTO: 39.3 % (ref 35–45)
HDLC SERPL-MCNC: 51 MG/DL
HGB BLD-MCNC: 12.8 G/DL (ref 11.7–15.5)
LDLC SERPL CALC-MCNC: 52 MG/DL (CALC)
LYMPHOCYTES # BLD AUTO: 2270 CELLS/UL (ref 850–3900)
LYMPHOCYTES NFR BLD AUTO: 48.3 %
MCH RBC QN AUTO: 31.1 PG (ref 27–33)
MCHC RBC AUTO-ENTMCNC: 32.6 G/DL (ref 32–36)
MCV RBC AUTO: 95.4 FL (ref 80–100)
MONOCYTES # BLD AUTO: 475 CELLS/UL (ref 200–950)
MONOCYTES NFR BLD AUTO: 10.1 %
NEUTROPHILS # BLD AUTO: 1936 CELLS/UL (ref 1500–7800)
NEUTROPHILS NFR BLD AUTO: 41.2 %
NONHDLC SERPL-MCNC: 68 MG/DL (CALC)
PLATELET # BLD AUTO: 162 THOUSAND/UL (ref 140–400)
PMV BLD REES-ECKER: 12 FL (ref 7.5–12.5)
POTASSIUM SERPL-SCNC: 4.2 MMOL/L (ref 3.5–5.3)
PROT SERPL-MCNC: 6.9 G/DL (ref 6.1–8.1)
RBC # BLD AUTO: 4.12 MILLION/UL (ref 3.8–5.1)
SODIUM SERPL-SCNC: 142 MMOL/L (ref 135–146)
TRIGL SERPL-MCNC: 79 MG/DL
TSH SERPL-ACNC: 1.4 MIU/L (ref 0.4–4.5)
WBC # BLD AUTO: 4.7 THOUSAND/UL (ref 3.8–10.8)

## 2023-07-26 DIAGNOSIS — I50.32 CHRONIC DIASTOLIC CONGESTIVE HEART FAILURE, NYHA CLASS 1: ICD-10-CM

## 2023-07-26 DIAGNOSIS — J44.9 CHRONIC OBSTRUCTIVE PULMONARY DISEASE, UNSPECIFIED COPD TYPE: Primary | ICD-10-CM

## 2023-07-26 DIAGNOSIS — M79.673 PAIN OF FOOT, UNSPECIFIED LATERALITY: Primary | ICD-10-CM

## 2023-07-26 DIAGNOSIS — R26.2 IMPAIRED AMBULATION: ICD-10-CM

## 2023-09-12 RX ORDER — OMEPRAZOLE 20 MG/1
20 CAPSULE, DELAYED RELEASE ORAL DAILY
Qty: 90 CAPSULE | Refills: 1 | Status: SHIPPED | OUTPATIENT
Start: 2023-09-12 | End: 2024-09-11

## 2023-09-12 RX ORDER — METOPROLOL SUCCINATE 25 MG/1
25 TABLET, EXTENDED RELEASE ORAL
COMMUNITY
Start: 2023-08-29 | End: 2023-12-04

## 2023-09-12 RX ORDER — MONTELUKAST SODIUM 10 MG/1
10 TABLET ORAL NIGHTLY
Qty: 90 TABLET | Refills: 1 | Status: SHIPPED | OUTPATIENT
Start: 2023-09-12

## 2023-09-12 NOTE — TELEPHONE ENCOUNTER
Care Due:                  Date            Visit Type   Department     Provider  --------------------------------------------------------------------------------                                EP -                              PRIMARY      [facility]  Last Visit: 06-      CARE ([facility])   CARE           Diane Trevino                              EP -                              PRIMARY      [facility] INTERNAL  Next Visit: 12-      CARE ([facility])   MEDICINE       Diane Trevino                                                            Last  Test          Frequency    Reason                     Performed    Due Date  --------------------------------------------------------------------------------    Uric Acid...  12 months..  allopurinoL..............  Not Found    Overdue    [employer] Embedded Care Due Messages. Reference number: [id number].   9/12/2023 11:36:56 AM CDT

## 2023-09-26 ENCOUNTER — TELEPHONE (OUTPATIENT)
Dept: PRIMARY CARE CLINIC | Facility: CLINIC | Age: 83
End: 2023-09-26
Payer: MEDICARE

## 2023-09-26 NOTE — TELEPHONE ENCOUNTER
----- Message from Marcelle Ricci sent at 9/26/2023 10:41 AM CDT -----  Contact: P [phone]  Type: Returning a call    Who left a message?Alison Rivera MA    When did the practice call? 09/26/2023    Comments: Please call and advise

## 2023-09-26 NOTE — TELEPHONE ENCOUNTER
----- Message from Marcelle Ricci sent at 9/26/2023 10:41 AM CDT -----  Contact: P [phone]  Type: Returning a call    Who left a message?Alison Rivera MA    When did the practice call? 09/26/2023    Comments: Please call and advise

## 2023-09-26 NOTE — TELEPHONE ENCOUNTER
----- Message from Paty Boyer sent at 9/25/2023  3:01 PM CDT -----  Regarding: Patient Advice                Name of Who is Calling: Brittnee Gupta    Who Left The Message: Brittnee Gupta      What is the request in detail:      Patient called requesting a call regarding her blood pressure medication. Patient has questions and concerns.  Please further advise.   Thank you    Reply by [facility]: NO      Preferred Call Back : [phone] ([phone])

## 2023-09-27 ENCOUNTER — OFFICE VISIT (OUTPATIENT)
Dept: PRIMARY CARE CLINIC | Facility: CLINIC | Age: 83
End: 2023-09-27
Payer: MEDICARE

## 2023-09-27 ENCOUNTER — TELEPHONE (OUTPATIENT)
Dept: PRIMARY CARE CLINIC | Facility: CLINIC | Age: 83
End: 2023-09-27

## 2023-09-27 VITALS
DIASTOLIC BLOOD PRESSURE: 70 MMHG | SYSTOLIC BLOOD PRESSURE: 152 MMHG | TEMPERATURE: 98 F | BODY MASS INDEX: 33.8 KG/M2 | WEIGHT: 198 LBS | HEART RATE: 68 BPM | HEIGHT: 64 IN | OXYGEN SATURATION: 95 %

## 2023-09-27 DIAGNOSIS — N39.41 URINARY INCONTINENCE, URGE: ICD-10-CM

## 2023-09-27 DIAGNOSIS — I10 BENIGN ESSENTIAL HTN: Primary | ICD-10-CM

## 2023-09-27 DIAGNOSIS — N39.46 MIXED STRESS AND URGE URINARY INCONTINENCE: ICD-10-CM

## 2023-09-27 DIAGNOSIS — R35.1 NOCTURIA: ICD-10-CM

## 2023-09-27 DIAGNOSIS — R41.81 AGE-RELATED COGNITIVE DECLINE: ICD-10-CM

## 2023-09-27 PROCEDURE — 99999 PR PBB SHADOW E&M-EST. PATIENT-LVL V: ICD-10-PCS | Mod: PBBFAC,,, | Performed by: NURSE PRACTITIONER

## 2023-09-27 PROCEDURE — 1160F RVW MEDS BY RX/DR IN RCRD: CPT | Mod: CPTII,S$GLB,, | Performed by: NURSE PRACTITIONER

## 2023-09-27 PROCEDURE — 1159F MED LIST DOCD IN RCRD: CPT | Mod: CPTII,S$GLB,, | Performed by: NURSE PRACTITIONER

## 2023-09-27 PROCEDURE — 3077F PR MOST RECENT SYSTOLIC BLOOD PRESSURE >= 140 MM HG: ICD-10-PCS | Mod: CPTII,S$GLB,, | Performed by: NURSE PRACTITIONER

## 2023-09-27 PROCEDURE — 3077F SYST BP >= 140 MM HG: CPT | Mod: CPTII,S$GLB,, | Performed by: NURSE PRACTITIONER

## 2023-09-27 PROCEDURE — 3078F DIAST BP <80 MM HG: CPT | Mod: CPTII,S$GLB,, | Performed by: NURSE PRACTITIONER

## 2023-09-27 PROCEDURE — 3288F FALL RISK ASSESSMENT DOCD: CPT | Mod: CPTII,S$GLB,, | Performed by: NURSE PRACTITIONER

## 2023-09-27 PROCEDURE — 99214 PR OFFICE/OUTPT VISIT, EST, LEVL IV, 30-39 MIN: ICD-10-PCS | Mod: S$GLB,,, | Performed by: NURSE PRACTITIONER

## 2023-09-27 PROCEDURE — 1126F PR PAIN SEVERITY QUANTIFIED, NO PAIN PRESENT: ICD-10-PCS | Mod: CPTII,S$GLB,, | Performed by: NURSE PRACTITIONER

## 2023-09-27 PROCEDURE — 1159F PR MEDICATION LIST DOCUMENTED IN MEDICAL RECORD: ICD-10-PCS | Mod: CPTII,S$GLB,, | Performed by: NURSE PRACTITIONER

## 2023-09-27 PROCEDURE — 1160F PR REVIEW ALL MEDS BY PRESCRIBER/CLIN PHARMACIST DOCUMENTED: ICD-10-PCS | Mod: CPTII,S$GLB,, | Performed by: NURSE PRACTITIONER

## 2023-09-27 PROCEDURE — 1101F PT FALLS ASSESS-DOCD LE1/YR: CPT | Mod: CPTII,S$GLB,, | Performed by: NURSE PRACTITIONER

## 2023-09-27 PROCEDURE — 99999 PR PBB SHADOW E&M-EST. PATIENT-LVL V: CPT | Mod: PBBFAC,,, | Performed by: NURSE PRACTITIONER

## 2023-09-27 PROCEDURE — 3288F PR FALLS RISK ASSESSMENT DOCUMENTED: ICD-10-PCS | Mod: CPTII,S$GLB,, | Performed by: NURSE PRACTITIONER

## 2023-09-27 PROCEDURE — 1101F PR PT FALLS ASSESS DOC 0-1 FALLS W/OUT INJ PAST YR: ICD-10-PCS | Mod: CPTII,S$GLB,, | Performed by: NURSE PRACTITIONER

## 2023-09-27 PROCEDURE — 99214 OFFICE O/P EST MOD 30 MIN: CPT | Mod: S$GLB,,, | Performed by: NURSE PRACTITIONER

## 2023-09-27 PROCEDURE — 1126F AMNT PAIN NOTED NONE PRSNT: CPT | Mod: CPTII,S$GLB,, | Performed by: NURSE PRACTITIONER

## 2023-09-27 PROCEDURE — 3078F PR MOST RECENT DIASTOLIC BLOOD PRESSURE < 80 MM HG: ICD-10-PCS | Mod: CPTII,S$GLB,, | Performed by: NURSE PRACTITIONER

## 2023-09-27 RX ORDER — TROSPIUM CHLORIDE ER 60 MG/1
60 CAPSULE ORAL DAILY
Qty: 30 CAPSULE | Refills: 4 | Status: SHIPPED | OUTPATIENT
Start: 2023-09-27 | End: 2023-09-28

## 2023-09-27 RX ORDER — MIRABEGRON 50 MG/1
1 TABLET, FILM COATED, EXTENDED RELEASE ORAL NIGHTLY
Qty: 90 TABLET | Refills: 1 | Status: SHIPPED | OUTPATIENT
Start: 2023-09-27 | End: 2024-09-26

## 2023-09-27 RX ORDER — VALSARTAN 160 MG/1
160 TABLET ORAL DAILY
Qty: 90 TABLET | Refills: 3 | Status: SHIPPED | OUTPATIENT
Start: 2023-09-27 | End: 2024-09-26

## 2023-09-27 NOTE — TELEPHONE ENCOUNTER
----- Message from Marcelle Ricci sent at 9/27/2023  4:01 PM CDT -----  Contact: [phone]  Type: Rx Clarification/ Additional Information/ Questions    Medication:trospium (SANCTURA XR) 60 mg Cp24 capsule    What questions do you have about the medication, if any? Insurance wants to chance the medication for Toviaz      Pharmacy number:   [facility] - [place], [place] - [address]  [address]  [place] [address]  Phone: [phone] Fax: [phone]      Comments: Please call and advise.

## 2023-09-27 NOTE — PROGRESS NOTES
"[facility] Note    Chief Complaint      Chief Complaint   Patient presents with    Hypertension       History of Present Illness      Brittnee Gupta is a 83 y.o. female who presents today for   Chief Complaint   Patient presents with    Hypertension         Patient presents to clinic today as a follow-up visit after presenting to [facility] emergency room for hypertension.  Today's blood pressure is 152/70.  She is currently taking amlodipine 10 mg daily, losartan 100 mg daily, and metoprolol 25 mg daily.  Patient reports ER physician's told her to stop taking furosemide because she complained of "peeing all night and wetting herself".  Patient has been without her medications of Mybetriq and trospium for an unknown amount of time. Medications were reordered by me today. Patient reports feeling well. She is without her hearing aid today because she needs a new battery and does not want to drive far to get a new battery from her audiologist in [facility].   Because of the difficulty in communication due to patient's inability to hear very well, I have given discharge instructions for patient to follow with regards to taking valsartan instead of losartan.  She is to stop losartan and start taking valsartan at 160 mg daily tomorrow.  I have restarted the furosemide at 20 mg daily.  I have restarted the Mybeteriq and trospium daily.  I have discussed with patient that it would like to see her in 1 month to follow up with her blood pressure and plan of care.  Patient is in agreement with this.  There was some confusion on how she has been taking her medications or what medications she has has not been taking.  With this plan of care I am hopeful that we can monitor more closely and improve patient's blood pressure.    Hypertension         Review of Systems   All 12 systems otherwise negative.       Family History:  family history is not on file.   Family history was reviewed with patient. "     Medications:  Outpatient Encounter Medications as of 9/27/2023   Medication Sig Dispense Refill    albuterol (PROVENTIL/VENTOLIN HFA) 90 mcg/actuation inhaler Inhale 2 puffs into the lungs every 6 (six) hours as needed for Wheezing. Rescue 18 g 3    allopurinoL (ZYLOPRIM) 300 MG tablet Take 1 tablet (300 mg total) by mouth once daily. 90 tablet 3    amLODIPine (NORVASC) 10 MG tablet Take 1 tablet (10 mg total) by mouth once daily. 90 tablet 3    aspirin (ECOTRIN) 81 MG EC tablet Take by mouth once daily.      atorvastatin (LIPITOR) 80 MG tablet Take 1 tablet (80 mg total) by mouth once daily. 90 tablet 3    azelastine (ASTELIN) 137 mcg (0.1 %) nasal spray 1 spray by Nasal route 2 (two) times daily.      calcium carbonate (OS-SURJIT) 500 mg calcium (1,250 mg) tablet Take 1 tablet by mouth once daily.      diphenhydrAMINE (BENADRYL) 25 mg capsule Take 25 mg by mouth every 6 (six) hours as needed for Itching.      docusate sodium (COLACE) 100 MG capsule Take 100 mg by mouth 2 (two) times daily.      fluticasone propion-salmeterol 45-21 mcg/dose (ADVAIR HFA) 45-21 mcg/actuation HFAA inhaler Inhale 2 puffs into the lungs every 12 (twelve) hours. Controller 12 g 4    gabapentin (NEURONTIN) 100 MG capsule Take 1 capsule (100 mg total) by mouth 3 (three) times daily. 270 capsule 3    loratadine (CLARITIN) 10 mg tablet Take 10 mg by mouth once daily.      losartan (COZAAR) 100 MG tablet Take 1 tablet (100 mg total) by mouth once daily. 90 tablet 3    metoprolol succinate (TOPROL-XL) 25 MG 24 hr tablet Take 25 mg by mouth.      montelukast (SINGULAIR) 10 mg tablet Take 1 tablet (10 mg total) by mouth every evening. 90 tablet 1    naproxen (NAPROSYN) 500 MG tablet Take 1 tablet (500 mg total) by mouth 2 (two) times daily with meals. 90 tablet 1    omeprazole (PRILOSEC) 20 MG capsule Take 1 capsule (20 mg total) by mouth once daily. 90 capsule 1    furosemide (LASIX) 20 MG tablet Take 1 tablet (20 mg total) by mouth once  "daily. (Patient not taking: Reported on 9/27/2023) 90 tablet 3    metoprolol succinate (TOPROL-XL) 50 MG 24 hr tablet Take 1 tablet (50 mg total) by mouth once daily. (Patient not taking: Reported on 9/27/2023) 90 tablet 3    mirabegron (MYRBETRIQ) 50 mg Tb24 Take 1 tablet (50 mg total) by mouth every evening. (Patient not taking: Reported on 9/27/2023) 90 tablet 1    trospium (SANCTURA XR) 60 mg Cp24 capsule Take 1 capsule (60 mg total) by mouth once daily. (Patient not taking: Reported on 9/27/2023) 30 capsule 11     No facility-administered encounter medications on file as of 9/27/2023.       Allergies:  Review of patient's allergies indicates:  No Known Allergies    Health Maintenance:  Health Maintenance   Topic Date Due    TETANUS VACCINE  Never done    DEXA Scan  02/25/2025    Lipid Panel  07/21/2028    Shingles Vaccine  Completed     Health Maintenance Topics with due status: Not Due       Topic Last Completion Date    DEXA Scan 02/25/2022    Hemoglobin A1c (Prediabetes) 07/21/2023    Lipid Panel 07/21/2023       Physical Exam      Vital Signs  Temp: 98.1 °F (36.7 °C)  Pulse: 68  SpO2: 95 %  BP: (!) 152/70  Pain Score: 0-No pain  Height and Weight  Height: 5' 4" (162.6 cm)  Weight: 89.8 kg (198 lb)  BSA (Calculated - sq m): 2.01 sq meters  BMI (Calculated): 34  Weight in (lb) to have BMI = 25: 145.3]    Physical Exam  Constitutional:       Appearance: Normal appearance. She is normal weight.   HENT:      Head: Normocephalic and atraumatic.      Nose: Nose normal.      Mouth/Throat:      Mouth: Mucous membranes are moist.      Pharynx: Oropharynx is clear.   Eyes:      Extraocular Movements: Extraocular movements intact.      Conjunctiva/sclera: Conjunctivae normal.      Pupils: Pupils are equal, round, and reactive to light.   Cardiovascular:      Rate and Rhythm: Normal rate and regular rhythm.      Pulses: Normal pulses.      Heart sounds: Normal heart sounds.   Pulmonary:      Effort: Pulmonary effort is " normal.      Breath sounds: Normal breath sounds.   Musculoskeletal:         General: Normal range of motion.      Cervical back: Normal range of motion and neck supple.   Skin:     General: Skin is warm and dry.      Capillary Refill: Capillary refill takes less than 2 seconds.   Neurological:      General: No focal deficit present.      Mental Status: She is alert and oriented to person, place, and time. Mental status is at baseline.   Psychiatric:         Mood and Affect: Mood normal.         Behavior: Behavior normal.         Thought Content: Thought content normal.         Judgment: Judgment normal.            Assessment/Plan     Brittnee Gupta is a 83 y.o.female with:    There are no diagnoses linked to this encounter.    As above, continue current medications and maintain follow up with specialists.  Return to clinic as needed.    Greater than 50% of visit was spent face to face with patient.  All questions were answered to patient's satisfaction.          Frances Rendon, NP-C  [facility]

## 2023-09-28 LAB
ALBUMIN SERPL-MCNC: 4.1 G/DL (ref 3.6–5.1)
ALBUMIN/GLOB SERPL: 1.3 (CALC) (ref 1–2.5)
ALP SERPL-CCNC: 93 U/L (ref 37–153)
ALT SERPL-CCNC: 10 U/L (ref 6–29)
AST SERPL-CCNC: 17 U/L (ref 10–35)
BASOPHILS # BLD AUTO: 20 CELLS/UL (ref 0–200)
BASOPHILS NFR BLD AUTO: 0.5 %
BILIRUB SERPL-MCNC: 1.3 MG/DL (ref 0.2–1.2)
BUN SERPL-MCNC: 16 MG/DL (ref 7–25)
BUN/CREAT SERPL: ABNORMAL (CALC) (ref 6–22)
CALCIUM SERPL-MCNC: 9.6 MG/DL (ref 8.6–10.4)
CHLORIDE SERPL-SCNC: 101 MMOL/L (ref 98–110)
CO2 SERPL-SCNC: 29 MMOL/L (ref 20–32)
CREAT SERPL-MCNC: 0.67 MG/DL (ref 0.6–0.95)
EGFR: 87 ML/MIN/1.73M2
EOSINOPHIL # BLD AUTO: 0 CELLS/UL (ref 15–500)
EOSINOPHIL NFR BLD AUTO: 0 %
ERYTHROCYTE [DISTWIDTH] IN BLOOD BY AUTOMATED COUNT: 13.1 % (ref 11–15)
GLOBULIN SER CALC-MCNC: 3.2 G/DL (CALC) (ref 1.9–3.7)
GLUCOSE SERPL-MCNC: 124 MG/DL (ref 65–99)
HCT VFR BLD AUTO: 40.9 % (ref 35–45)
HGB BLD-MCNC: 13.7 G/DL (ref 11.7–15.5)
LYMPHOCYTES # BLD AUTO: 1792 CELLS/UL (ref 850–3900)
LYMPHOCYTES NFR BLD AUTO: 44.8 %
MCH RBC QN AUTO: 31.3 PG (ref 27–33)
MCHC RBC AUTO-ENTMCNC: 33.5 G/DL (ref 32–36)
MCV RBC AUTO: 93.4 FL (ref 80–100)
MONOCYTES # BLD AUTO: 304 CELLS/UL (ref 200–950)
MONOCYTES NFR BLD AUTO: 7.6 %
NEUTROPHILS # BLD AUTO: 1884 CELLS/UL (ref 1500–7800)
NEUTROPHILS NFR BLD AUTO: 47.1 %
PLATELET # BLD AUTO: 178 THOUSAND/UL (ref 140–400)
PMV BLD REES-ECKER: 12.5 FL (ref 7.5–12.5)
POTASSIUM SERPL-SCNC: 3.8 MMOL/L (ref 3.5–5.3)
PROT SERPL-MCNC: 7.3 G/DL (ref 6.1–8.1)
RBC # BLD AUTO: 4.38 MILLION/UL (ref 3.8–5.1)
SODIUM SERPL-SCNC: 141 MMOL/L (ref 135–146)
WBC # BLD AUTO: 4 THOUSAND/UL (ref 3.8–10.8)

## 2023-09-28 RX ORDER — FESOTERODINE FUMARATE 4 MG/1
4 TABLET, EXTENDED RELEASE ORAL DAILY
Qty: 30 TABLET | Refills: 5 | Status: SHIPPED | OUTPATIENT
Start: 2023-09-28 | End: 2024-03-13

## 2023-10-03 ENCOUNTER — TELEPHONE (OUTPATIENT)
Dept: PRIMARY CARE CLINIC | Facility: CLINIC | Age: 83
End: 2023-10-03
Payer: MEDICARE

## 2023-10-13 NOTE — PROGRESS NOTES
[facility] Note    Chief Complaint      Chief Complaint   Patient presents with    Follow-up    Hypertension       History of Present Illness      Brittnee Gupta is a 83 y.o. female who presents today for   Chief Complaint   Patient presents with    Follow-up    Hypertension         Patient presents to clinic today for a follow-up visit and to establish primary care with me today.  Her previous primary care provider is leaving to go to another site which is inconvenient for patient to travel to.  Patient reports feeling well today.  She is not wearing hearing aids today because her battery needs to be replaced.  Blood pressure is 118/70 today.  She is tolerating the new blood pressure medications I adjusted from last visit.  There are no complaints today.         Review of Systems   All 12 systems otherwise negative.       Family History:  family history is not on file.   Family history was reviewed with patient.     Medications:  Outpatient Encounter Medications as of 11/3/2023   Medication Sig Dispense Refill    albuterol (PROVENTIL/VENTOLIN HFA) 90 mcg/actuation inhaler Inhale 2 puffs into the lungs every 6 (six) hours as needed for Wheezing. Rescue 18 g 3    allopurinoL (ZYLOPRIM) 300 MG tablet Take 1 tablet (300 mg total) by mouth once daily. 90 tablet 3    amLODIPine (NORVASC) 10 MG tablet Take 1 tablet (10 mg total) by mouth once daily. 90 tablet 3    aspirin (ECOTRIN) 81 MG EC tablet Take by mouth once daily.      atorvastatin (LIPITOR) 80 MG tablet Take 1 tablet (80 mg total) by mouth once daily. 90 tablet 3    calcium carbonate (OS-SURJIT) 500 mg calcium (1,250 mg) tablet Take 1 tablet by mouth once daily.      fesoterodine (TOVIAZ) 4 mg Tb24 Take 1 tablet (4 mg total) by mouth once daily. 30 tablet 5    fluticasone propion-salmeterol 45-21 mcg/dose (ADVAIR HFA) 45-21 mcg/actuation HFAA inhaler Inhale 2 puffs into the lungs every 12 (twelve) hours. Controller 12 g 4    furosemide (LASIX) 20  "MG tablet Take 1 tablet (20 mg total) by mouth once daily. 90 tablet 3    gabapentin (NEURONTIN) 100 MG capsule Take 1 capsule (100 mg total) by mouth 3 (three) times daily. 270 capsule 3    loratadine (CLARITIN) 10 mg tablet Take 10 mg by mouth once daily.      metoprolol succinate (TOPROL-XL) 25 MG 24 hr tablet Take 25 mg by mouth.      montelukast (SINGULAIR) 10 mg tablet Take 1 tablet (10 mg total) by mouth every evening. 90 tablet 1    naproxen (NAPROSYN) 500 MG tablet Take 1 tablet (500 mg total) by mouth 2 (two) times daily with meals. 90 tablet 1    omeprazole (PRILOSEC) 20 MG capsule Take 1 capsule (20 mg total) by mouth once daily. 90 capsule 1    valsartan (DIOVAN) 160 MG tablet Take 1 tablet (160 mg total) by mouth once daily. 90 tablet 3    azelastine (ASTELIN) 137 mcg (0.1 %) nasal spray 1 spray by Nasal route 2 (two) times daily.      diphenhydrAMINE (BENADRYL) 25 mg capsule Take 25 mg by mouth every 6 (six) hours as needed for Itching.      docusate sodium (COLACE) 100 MG capsule Take 100 mg by mouth 2 (two) times daily.      mirabegron (MYRBETRIQ) 50 mg Tb24 Take 1 tablet (50 mg total) by mouth every evening. (Patient not taking: Reported on 11/3/2023) 90 tablet 1     No facility-administered encounter medications on file as of 11/3/2023.       Allergies:  Review of patient's allergies indicates:  No Known Allergies    Health Maintenance:  Health Maintenance   Topic Date Due    TETANUS VACCINE  Never done    DEXA Scan  02/25/2025    Lipid Panel  07/21/2028    Shingles Vaccine  Completed     Health Maintenance Topics with due status: Not Due       Topic Last Completion Date    DEXA Scan 02/25/2022    Hemoglobin A1c (Prediabetes) 07/21/2023    Lipid Panel 07/21/2023       Physical Exam      Vital Signs  Pulse: 102  Resp: 16  SpO2: 95 %  BP: 138/74  BP Location: Right arm  Patient Position: Sitting  Pain Score:   7  Pain Loc: Generalized  Height and Weight  Height: 5' 4" (162.6 cm)  Weight: 89 kg (196 " lb 3.4 oz)  BSA (Calculated - sq m): 2 sq meters  BMI (Calculated): 33.7  Weight in (lb) to have BMI = 25: 145.3]    Physical Exam  Vitals reviewed.   Constitutional:       Appearance: Normal appearance. She is normal weight.   HENT:      Head: Normocephalic and atraumatic.      Nose: Nose normal.      Mouth/Throat:      Mouth: Mucous membranes are dry.      Pharynx: Oropharynx is clear.   Eyes:      Extraocular Movements: Extraocular movements intact.      Conjunctiva/sclera: Conjunctivae normal.      Pupils: Pupils are equal, round, and reactive to light.   Cardiovascular:      Rate and Rhythm: Normal rate and regular rhythm.      Pulses: Normal pulses.      Heart sounds: Normal heart sounds.   Pulmonary:      Effort: Pulmonary effort is normal.      Breath sounds: Normal breath sounds.   Musculoskeletal:         General: Normal range of motion.      Cervical back: Normal range of motion and neck supple.   Skin:     General: Skin is warm and dry.      Capillary Refill: Capillary refill takes less than 2 seconds.   Neurological:      General: No focal deficit present.      Mental Status: She is alert. Mental status is at baseline.   Psychiatric:         Mood and Affect: Mood normal.         Behavior: Behavior normal.         Thought Content: Thought content normal.         Judgment: Judgment normal.            Assessment/Plan     Brittnee Gupta is a 83 y.o.female with:    There are no diagnoses linked to this encounter.    As above, continue current medications and maintain follow up with specialists.  Return to clinic as needed.    Greater than 50% of visit was spent face to face with patient.  All questions were answered to patient's satisfaction.          Karen L Spencer, NP-C [facility]

## 2023-11-03 ENCOUNTER — OFFICE VISIT (OUTPATIENT)
Dept: PRIMARY CARE CLINIC | Facility: CLINIC | Age: 83
End: 2023-11-03
Payer: MEDICARE

## 2023-11-03 VITALS
SYSTOLIC BLOOD PRESSURE: 118 MMHG | BODY MASS INDEX: 33.49 KG/M2 | OXYGEN SATURATION: 95 % | HEART RATE: 102 BPM | HEIGHT: 64 IN | RESPIRATION RATE: 16 BRPM | WEIGHT: 196.19 LBS | DIASTOLIC BLOOD PRESSURE: 70 MMHG

## 2023-11-03 DIAGNOSIS — I10 BENIGN ESSENTIAL HTN: Primary | ICD-10-CM

## 2023-11-03 PROCEDURE — 1125F AMNT PAIN NOTED PAIN PRSNT: CPT | Mod: CPTII,S$GLB,, | Performed by: NURSE PRACTITIONER

## 2023-11-03 PROCEDURE — 1125F PR PAIN SEVERITY QUANTIFIED, PAIN PRESENT: ICD-10-PCS | Mod: CPTII,S$GLB,, | Performed by: NURSE PRACTITIONER

## 2023-11-03 PROCEDURE — 1160F PR REVIEW ALL MEDS BY PRESCRIBER/CLIN PHARMACIST DOCUMENTED: ICD-10-PCS | Mod: CPTII,S$GLB,, | Performed by: NURSE PRACTITIONER

## 2023-11-03 PROCEDURE — 3074F PR MOST RECENT SYSTOLIC BLOOD PRESSURE < 130 MM HG: ICD-10-PCS | Mod: CPTII,S$GLB,, | Performed by: NURSE PRACTITIONER

## 2023-11-03 PROCEDURE — 99999 PR PBB SHADOW E&M-EST. PATIENT-LVL V: CPT | Mod: PBBFAC,,, | Performed by: NURSE PRACTITIONER

## 2023-11-03 PROCEDURE — 1160F RVW MEDS BY RX/DR IN RCRD: CPT | Mod: CPTII,S$GLB,, | Performed by: NURSE PRACTITIONER

## 2023-11-03 PROCEDURE — 1159F MED LIST DOCD IN RCRD: CPT | Mod: CPTII,S$GLB,, | Performed by: NURSE PRACTITIONER

## 2023-11-03 PROCEDURE — 3078F PR MOST RECENT DIASTOLIC BLOOD PRESSURE < 80 MM HG: ICD-10-PCS | Mod: CPTII,S$GLB,, | Performed by: NURSE PRACTITIONER

## 2023-11-03 PROCEDURE — 3074F SYST BP LT 130 MM HG: CPT | Mod: CPTII,S$GLB,, | Performed by: NURSE PRACTITIONER

## 2023-11-03 PROCEDURE — 1159F PR MEDICATION LIST DOCUMENTED IN MEDICAL RECORD: ICD-10-PCS | Mod: CPTII,S$GLB,, | Performed by: NURSE PRACTITIONER

## 2023-11-03 PROCEDURE — 99214 PR OFFICE/OUTPT VISIT, EST, LEVL IV, 30-39 MIN: ICD-10-PCS | Mod: S$GLB,,, | Performed by: NURSE PRACTITIONER

## 2023-11-03 PROCEDURE — 3078F DIAST BP <80 MM HG: CPT | Mod: CPTII,S$GLB,, | Performed by: NURSE PRACTITIONER

## 2023-11-03 PROCEDURE — 99999 PR PBB SHADOW E&M-EST. PATIENT-LVL V: ICD-10-PCS | Mod: PBBFAC,,, | Performed by: NURSE PRACTITIONER

## 2023-11-03 PROCEDURE — 99214 OFFICE O/P EST MOD 30 MIN: CPT | Mod: S$GLB,,, | Performed by: NURSE PRACTITIONER

## 2023-12-04 RX ORDER — METOPROLOL SUCCINATE 25 MG/1
25 TABLET, EXTENDED RELEASE ORAL
Qty: 90 TABLET | Refills: 1 | Status: SHIPPED | OUTPATIENT
Start: 2023-12-04

## 2023-12-04 NOTE — TELEPHONE ENCOUNTER
Refill Routing Note   Medication(s) are not appropriate for processing by [facility] for the following reason(s):        Responsible provider unclear  Non-participating provider    [facility] action(s):  Route               Appointments  past 12m or future 3m with PCP    Date Provider   Last Visit   11/3/2023 Frances Rendon NP   Next Visit   5/3/2024 Frances Rendon NP   ED visits in past 90 days: 0        Note composed:11:49 AM 12/04/2023

## 2024-03-13 RX ORDER — FESOTERODINE FUMARATE 4 MG/1
4 TABLET, FILM COATED, EXTENDED RELEASE ORAL
Qty: 30 TABLET | Refills: 5 | Status: SHIPPED | OUTPATIENT
Start: 2024-03-13

## 2024-03-13 NOTE — TELEPHONE ENCOUNTER
Refill Routing Note   Medication(s) are not appropriate for processing by [facility] for the following reason(s):        Non-participating provider  Responsible provider unclear    [facility] action(s):  Route             Appointments  past 12m or future 3m with PCP    Date Provider   Last Visit   11/3/2023 Frances Rendon NP   Next Visit   5/3/2024 Frances Rendon NP   ED visits in past 90 days: 0        Note composed:12:35 PM 03/13/2024

## 2024-05-14 RX ORDER — OMEPRAZOLE 20 MG/1
20 CAPSULE, DELAYED RELEASE ORAL DAILY
Qty: 90 CAPSULE | Refills: 1 | Status: SHIPPED | OUTPATIENT
Start: 2024-05-14 | End: 2025-05-14

## 2024-05-14 NOTE — TELEPHONE ENCOUNTER
Refill Routing Note   Medication(s) are not appropriate for processing by [facility] for the following reason(s):        Non-participating provider    [facility] action(s):  Route               Appointments  past 12m or future 3m with PCP    Date Provider   Last Visit   11/3/2023 Frances Rendon, NICK   Next Visit   Visit date not found Frances Rendon NP   ED visits in past 90 days: 0        Note composed:2:05 PM 05/14/2024

## 2024-05-23 RX ORDER — METOPROLOL SUCCINATE 25 MG/1
25 TABLET, EXTENDED RELEASE ORAL DAILY
Qty: 90 TABLET | Refills: 1 | Status: CANCELLED | OUTPATIENT
Start: 2024-05-23

## 2024-07-05 DIAGNOSIS — I10 BENIGN ESSENTIAL HTN: Primary | ICD-10-CM

## 2024-07-05 RX ORDER — VALSARTAN 160 MG/1
160 TABLET ORAL DAILY
Qty: 90 TABLET | Refills: 3 | Status: SHIPPED | OUTPATIENT
Start: 2024-07-05 | End: 2025-07-05

## 2024-07-08 DIAGNOSIS — I10 BENIGN ESSENTIAL HTN: ICD-10-CM

## 2024-07-09 DIAGNOSIS — I10 BENIGN ESSENTIAL HTN: ICD-10-CM

## 2024-07-09 RX ORDER — AMLODIPINE BESYLATE 10 MG/1
10 TABLET ORAL
Qty: 90 TABLET | Refills: 3 | Status: SHIPPED | OUTPATIENT
Start: 2024-07-09 | End: 2024-07-10

## 2024-07-09 NOTE — TELEPHONE ENCOUNTER
Refill Routing Note   Medication(s) are not appropriate for processing by [facility] for the following reason(s):        Non-participating provider    [facility] action(s):  Route             Appointments  past 12m or future 3m with PCP    Date Provider   Last Visit   11/3/2023 Frances Rendon, NICK   Next Visit   Visit date not found Frances Rendon NP   ED visits in past 90 days: 0        Note composed:6:28 PM 07/09/2024

## 2024-07-09 NOTE — TELEPHONE ENCOUNTER
Refill Routing Note   Medication(s) are not appropriate for processing by [facility] for the following reason(s):        Non-participating provider    [facility] action(s):  Route               Appointments  past 12m or future 3m with PCP    Date Provider   Last Visit   11/3/2023 Frances Rendon, NICK   Next Visit   Visit date not found Frances Rendon NP   ED visits in past 90 days: 0        Note composed:8:09 AM 07/09/2024

## 2024-07-10 RX ORDER — AMLODIPINE BESYLATE 10 MG/1
10 TABLET ORAL
Qty: 90 TABLET | Refills: 3 | Status: SHIPPED | OUTPATIENT
Start: 2024-07-10

## 2024-10-28 ENCOUNTER — PATIENT MESSAGE (OUTPATIENT)
Dept: PRIMARY CARE CLINIC | Facility: CLINIC | Age: 84
End: 2024-10-28
Payer: MEDICARE

## 2024-10-28 RX ORDER — OMEPRAZOLE 20 MG/1
20 CAPSULE, DELAYED RELEASE ORAL DAILY
Qty: 90 CAPSULE | Refills: 1 | Status: SHIPPED | OUTPATIENT
Start: 2024-10-28 | End: 2025-10-28

## 2024-12-12 DIAGNOSIS — I10 BENIGN ESSENTIAL HTN: ICD-10-CM

## 2024-12-12 RX ORDER — METOPROLOL SUCCINATE 25 MG/1
25 TABLET, EXTENDED RELEASE ORAL DAILY
Qty: 30 TABLET | Refills: 0 | Status: SHIPPED | OUTPATIENT
Start: 2024-12-12

## 2025-03-18 ENCOUNTER — TELEPHONE (OUTPATIENT)
Dept: PRIMARY CARE CLINIC | Facility: CLINIC | Age: 85
End: 2025-03-18
Payer: MEDICARE

## 2025-03-18 NOTE — TELEPHONE ENCOUNTER
LOV 11/3/23     N/A to LVM for Pt to Call office to schedule for Annual with Alyssa Rendon   Pt need a in person  appt      Please advised

## 2025-03-18 NOTE — TELEPHONE ENCOUNTER
----- Message from Cristino sent at 3/18/2025 12:10 PM CDT -----  Contact: [phone]@patient  Requesting an RX refill or new RX.metoprolol succinate (TOPROL-XL) 25 MG 24 hr tabletIs this a refill or new RX: refill RX name and strength (copy/paste from chart):  Is this a 30 day or 90 day RX: Pharmacy name and phone #   [facility] [place] [address] doctors have asked that we provide their patients with the following 2 reminders -- prescription refills can take up to 72 hours, and a friendly reminder that in the future you can use your [facility] account to request refills:

## 2025-03-24 ENCOUNTER — PATIENT MESSAGE (OUTPATIENT)
Dept: INTERNAL MEDICINE | Facility: CLINIC | Age: 85
End: 2025-03-24
Payer: MEDICARE

## 2025-03-24 DIAGNOSIS — I10 BENIGN ESSENTIAL HTN: ICD-10-CM

## 2025-03-24 RX ORDER — METOPROLOL SUCCINATE 25 MG/1
25 TABLET, EXTENDED RELEASE ORAL DAILY
Qty: 30 TABLET | Refills: 0 | OUTPATIENT
Start: 2025-03-24

## 2025-03-24 NOTE — TELEPHONE ENCOUNTER
----- Message from Adis sent at 3/24/2025 11:53 AM CDT -----  Regarding: Refill Request  Contact: Pharmacy [phone]  Requesting an RX refill or new RX.Is this a refill or new RX: RefillRX name and strength (copy/paste from chart):  metoprolol succinate (TOPROL-XL) 25 MG 24 hr tabletIs this a 30 day or 90 day RX: Pharmacy name and phone # (copy/paste from chart):  [facility] - [facility] [address]  [facility] [address]: [phone] Fax: [phone]

## 2025-06-25 ENCOUNTER — PATIENT MESSAGE (OUTPATIENT)
Dept: ADMINISTRATIVE | Facility: HOSPITAL | Age: 85
End: 2025-06-25
Payer: MEDICARE